# Patient Record
Sex: FEMALE | Race: ASIAN | Employment: FULL TIME | URBAN - METROPOLITAN AREA
[De-identification: names, ages, dates, MRNs, and addresses within clinical notes are randomized per-mention and may not be internally consistent; named-entity substitution may affect disease eponyms.]

---

## 2017-01-30 ENCOUNTER — OP HISTORICAL/CONVERTED ENCOUNTER (OUTPATIENT)
Dept: OTHER | Age: 62
End: 2017-01-30

## 2017-02-01 ENCOUNTER — OP HISTORICAL/CONVERTED ENCOUNTER (OUTPATIENT)
Dept: OTHER | Age: 62
End: 2017-02-01

## 2017-02-16 ENCOUNTER — OP HISTORICAL/CONVERTED ENCOUNTER (OUTPATIENT)
Dept: OTHER | Age: 62
End: 2017-02-16

## 2017-02-22 ENCOUNTER — OP HISTORICAL/CONVERTED ENCOUNTER (OUTPATIENT)
Dept: OTHER | Age: 62
End: 2017-02-22

## 2018-06-01 ENCOUNTER — ED HISTORICAL/CONVERTED ENCOUNTER (OUTPATIENT)
Dept: OTHER | Age: 63
End: 2018-06-01

## 2019-05-21 ENCOUNTER — OP HISTORICAL/CONVERTED ENCOUNTER (OUTPATIENT)
Dept: OTHER | Age: 64
End: 2019-05-21

## 2019-06-28 ENCOUNTER — OP HISTORICAL/CONVERTED ENCOUNTER (OUTPATIENT)
Dept: OTHER | Age: 64
End: 2019-06-28

## 2019-07-16 ENCOUNTER — OP HISTORICAL/CONVERTED ENCOUNTER (OUTPATIENT)
Dept: OTHER | Age: 64
End: 2019-07-16

## 2021-10-04 ENCOUNTER — TRANSCRIBE ORDER (OUTPATIENT)
Dept: SCHEDULING | Age: 66
End: 2021-10-04

## 2021-10-04 DIAGNOSIS — Z12.31 SCREENING MAMMOGRAM FOR BREAST CANCER: Primary | ICD-10-CM

## 2022-04-18 ENCOUNTER — TRANSCRIBE ORDER (OUTPATIENT)
Dept: SCHEDULING | Age: 67
End: 2022-04-18

## 2022-04-18 DIAGNOSIS — Z12.31 VISIT FOR SCREENING MAMMOGRAM: Primary | ICD-10-CM

## 2022-04-21 ENCOUNTER — HOSPITAL ENCOUNTER (OUTPATIENT)
Age: 67
Setting detail: OBSERVATION
Discharge: HOME | End: 2022-04-23
Attending: EMERGENCY MEDICINE | Admitting: HOSPITALIST
Payer: COMMERCIAL

## 2022-04-21 ENCOUNTER — APPOINTMENT (OUTPATIENT)
Dept: CT IMAGING | Age: 67
End: 2022-04-21
Attending: EMERGENCY MEDICINE
Payer: COMMERCIAL

## 2022-04-21 DIAGNOSIS — I95.9 HYPOTENSION, UNSPECIFIED HYPOTENSION TYPE: Primary | ICD-10-CM

## 2022-04-21 DIAGNOSIS — K86.89 PANCREATIC MASS: ICD-10-CM

## 2022-04-21 LAB
ALBUMIN SERPL-MCNC: 3.4 G/DL (ref 3.5–5)
ALBUMIN/GLOB SERPL: 1 {RATIO} (ref 1.1–2.2)
ALP SERPL-CCNC: 69 U/L (ref 45–117)
ALT SERPL-CCNC: 31 U/L (ref 12–78)
ANION GAP SERPL CALC-SCNC: 9 MMOL/L (ref 5–15)
APPEARANCE UR: ABNORMAL
AST SERPL W P-5'-P-CCNC: 25 U/L (ref 15–37)
BACTERIA URNS QL MICRO: ABNORMAL /HPF
BASOPHILS # BLD: 0 K/UL (ref 0–0.1)
BASOPHILS NFR BLD: 0 % (ref 0–1)
BILIRUB DIRECT SERPL-MCNC: 0.2 MG/DL (ref 0–0.2)
BILIRUB SERPL-MCNC: 0.5 MG/DL (ref 0.2–1)
BILIRUB UR QL: NEGATIVE
BUN SERPL-MCNC: 23 MG/DL (ref 6–20)
BUN/CREAT SERPL: 22 (ref 12–20)
CA-I BLD-MCNC: 8.9 MG/DL (ref 8.5–10.1)
CHLORIDE SERPL-SCNC: 104 MMOL/L (ref 97–108)
CO2 SERPL-SCNC: 27 MMOL/L (ref 21–32)
COLOR UR: YELLOW
CREAT SERPL-MCNC: 1.05 MG/DL (ref 0.55–1.02)
DIFFERENTIAL METHOD BLD: ABNORMAL
EOSINOPHIL # BLD: 0.1 K/UL (ref 0–0.4)
EOSINOPHIL NFR BLD: 1 % (ref 0–7)
EPITH CASTS URNS QL MICRO: ABNORMAL /LPF
ERYTHROCYTE [DISTWIDTH] IN BLOOD BY AUTOMATED COUNT: 13.3 % (ref 11.5–14.5)
GLOBULIN SER CALC-MCNC: 3.4 G/DL (ref 2–4)
GLUCOSE SERPL-MCNC: 148 MG/DL (ref 65–100)
GLUCOSE UR STRIP.AUTO-MCNC: NEGATIVE MG/DL
HCT VFR BLD AUTO: 33.4 % (ref 35–47)
HGB BLD-MCNC: 11.3 G/DL (ref 11.5–16)
HGB UR QL STRIP: NEGATIVE
IMM GRANULOCYTES # BLD AUTO: 0.1 K/UL (ref 0–0.04)
IMM GRANULOCYTES NFR BLD AUTO: 1 % (ref 0–0.5)
KETONES UR QL STRIP.AUTO: NEGATIVE MG/DL
LEUKOCYTE ESTERASE UR QL STRIP.AUTO: ABNORMAL
LIPASE SERPL-CCNC: 108 U/L (ref 73–393)
LYMPHOCYTES # BLD: 2.3 K/UL (ref 0.8–3.5)
LYMPHOCYTES NFR BLD: 25 % (ref 12–49)
MCH RBC QN AUTO: 30.5 PG (ref 26–34)
MCHC RBC AUTO-ENTMCNC: 33.8 G/DL (ref 30–36.5)
MCV RBC AUTO: 90.3 FL (ref 80–99)
MONOCYTES # BLD: 0.6 K/UL (ref 0–1)
MONOCYTES NFR BLD: 6 % (ref 5–13)
MUCOUS THREADS URNS QL MICRO: ABNORMAL /LPF
NEUTS SEG # BLD: 6.2 K/UL (ref 1.8–8)
NEUTS SEG NFR BLD: 67 % (ref 32–75)
NITRITE UR QL STRIP.AUTO: NEGATIVE
PH UR STRIP: 5 [PH] (ref 5–8)
PLATELET # BLD AUTO: 272 K/UL (ref 150–400)
PMV BLD AUTO: 9.7 FL (ref 8.9–12.9)
POTASSIUM SERPL-SCNC: 4 MMOL/L (ref 3.5–5.1)
PROT SERPL-MCNC: 6.8 G/DL (ref 6.4–8.2)
PROT UR STRIP-MCNC: ABNORMAL MG/DL
RBC # BLD AUTO: 3.7 M/UL (ref 3.8–5.2)
RBC #/AREA URNS HPF: ABNORMAL /HPF (ref 0–5)
SODIUM SERPL-SCNC: 140 MMOL/L (ref 136–145)
SP GR UR REFRACTOMETRY: 1.01 (ref 1–1.03)
TROPONIN-HIGH SENSITIVITY: 14 NG/L (ref 0–51)
UROBILINOGEN UR QL STRIP.AUTO: 0.1 EU/DL (ref 0.2–1)
WBC # BLD AUTO: 9.2 K/UL (ref 3.6–11)
WBC URNS QL MICRO: ABNORMAL /HPF (ref 0–4)

## 2022-04-21 PROCEDURE — 80076 HEPATIC FUNCTION PANEL: CPT

## 2022-04-21 PROCEDURE — 85025 COMPLETE CBC W/AUTO DIFF WBC: CPT

## 2022-04-21 PROCEDURE — 80048 BASIC METABOLIC PNL TOTAL CA: CPT

## 2022-04-21 PROCEDURE — 93005 ELECTROCARDIOGRAM TRACING: CPT

## 2022-04-21 PROCEDURE — 99285 EMERGENCY DEPT VISIT HI MDM: CPT

## 2022-04-21 PROCEDURE — 84484 ASSAY OF TROPONIN QUANT: CPT

## 2022-04-21 PROCEDURE — 74177 CT ABD & PELVIS W/CONTRAST: CPT

## 2022-04-21 PROCEDURE — 83690 ASSAY OF LIPASE: CPT

## 2022-04-21 PROCEDURE — 74011000636 HC RX REV CODE- 636: Performed by: EMERGENCY MEDICINE

## 2022-04-21 PROCEDURE — 81001 URINALYSIS AUTO W/SCOPE: CPT

## 2022-04-21 PROCEDURE — 36415 COLL VENOUS BLD VENIPUNCTURE: CPT

## 2022-04-21 PROCEDURE — 74011250636 HC RX REV CODE- 250/636: Performed by: EMERGENCY MEDICINE

## 2022-04-21 RX ORDER — FOLIC ACID 1 MG/1
TABLET ORAL
COMMUNITY

## 2022-04-21 RX ORDER — AMLODIPINE BESYLATE AND BENAZEPRIL HYDROCHLORIDE 10; 40 MG/1; MG/1
CAPSULE ORAL
COMMUNITY
Start: 2022-02-24

## 2022-04-21 RX ORDER — CLOBETASOL PROPIONATE 0.5 MG/G
CREAM TOPICAL
COMMUNITY
Start: 2022-04-18

## 2022-04-21 RX ORDER — METHOTREXATE 25 MG/ML
INJECTION, SOLUTION INTRA-ARTERIAL; INTRAMUSCULAR; INTRAVENOUS
COMMUNITY
Start: 2022-03-24

## 2022-04-21 RX ORDER — IXEKIZUMAB 80 MG/ML
INJECTION, SOLUTION SUBCUTANEOUS
COMMUNITY
Start: 2022-04-11

## 2022-04-21 RX ORDER — TIZANIDINE HYDROCHLORIDE 6 MG/1
CAPSULE, GELATIN COATED ORAL
COMMUNITY
Start: 2022-02-24 | End: 2022-04-23

## 2022-04-21 RX ORDER — LEVOTHYROXINE SODIUM 112 UG/1
TABLET ORAL
COMMUNITY
Start: 2022-04-18

## 2022-04-21 RX ORDER — OMEPRAZOLE 40 MG/1
CAPSULE, DELAYED RELEASE ORAL
COMMUNITY
Start: 2022-04-18

## 2022-04-21 RX ADMIN — IOPAMIDOL 100 ML: 755 INJECTION, SOLUTION INTRAVENOUS at 23:09

## 2022-04-21 RX ADMIN — SODIUM CHLORIDE 1000 ML: 9 INJECTION, SOLUTION INTRAVENOUS at 22:23

## 2022-04-21 NOTE — Clinical Note
Patient Class[de-identified] OBSERVATION [104]   Type of Bed: Remote Telemetry [29]   Cardiac Monitoring Required?: Yes   Reason for Observation: hypotension / pancreatic mass   Admitting Diagnosis: Hypotension [484997]   Admitting Diagnosis: Pancreatic mass [[id number]]   Admitting Physician: Lieutenant Adam [[id number]]   Attending Physician: Lieutenant Adma [[id number]]

## 2022-04-22 ENCOUNTER — APPOINTMENT (OUTPATIENT)
Dept: MRI IMAGING | Age: 67
End: 2022-04-22
Attending: HOSPITALIST
Payer: COMMERCIAL

## 2022-04-22 PROBLEM — I95.9 HYPOTENSION: Status: ACTIVE | Noted: 2022-04-22

## 2022-04-22 PROBLEM — K86.89 PANCREATIC MASS: Status: ACTIVE | Noted: 2022-04-22

## 2022-04-22 LAB
ALBUMIN SERPL-MCNC: 3.2 G/DL (ref 3.5–5)
ANION GAP SERPL CALC-SCNC: 4 MMOL/L (ref 5–15)
ATRIAL RATE: 59 BPM
BUN SERPL-MCNC: 14 MG/DL (ref 6–20)
BUN/CREAT SERPL: 18 (ref 12–20)
CA-I BLD-MCNC: 8.3 MG/DL (ref 8.5–10.1)
CALCULATED P AXIS, ECG09: 45 DEGREES
CALCULATED R AXIS, ECG10: 32 DEGREES
CALCULATED T AXIS, ECG11: 46 DEGREES
CHLORIDE SERPL-SCNC: 110 MMOL/L (ref 97–108)
CO2 SERPL-SCNC: 28 MMOL/L (ref 21–32)
CREAT SERPL-MCNC: 0.76 MG/DL (ref 0.55–1.02)
DIAGNOSIS, 93000: NORMAL
GLUCOSE SERPL-MCNC: 146 MG/DL (ref 65–100)
P-R INTERVAL, ECG05: 162 MS
PHOSPHATE SERPL-MCNC: 3.1 MG/DL (ref 2.6–4.7)
POTASSIUM SERPL-SCNC: 3.8 MMOL/L (ref 3.5–5.1)
Q-T INTERVAL, ECG07: 432 MS
QRS DURATION, ECG06: 66 MS
QTC CALCULATION (BEZET), ECG08: 427 MS
SODIUM SERPL-SCNC: 142 MMOL/L (ref 136–145)
VENTRICULAR RATE, ECG03: 59 BPM

## 2022-04-22 PROCEDURE — 74011250636 HC RX REV CODE- 250/636: Performed by: HOSPITALIST

## 2022-04-22 PROCEDURE — A9577 INJ MULTIHANCE: HCPCS | Performed by: HOSPITALIST

## 2022-04-22 PROCEDURE — 36415 COLL VENOUS BLD VENIPUNCTURE: CPT

## 2022-04-22 PROCEDURE — G0378 HOSPITAL OBSERVATION PER HR: HCPCS

## 2022-04-22 PROCEDURE — 74182 MRI ABDOMEN W/CONTRAST: CPT

## 2022-04-22 PROCEDURE — 96372 THER/PROPH/DIAG INJ SC/IM: CPT

## 2022-04-22 PROCEDURE — 96374 THER/PROPH/DIAG INJ IV PUSH: CPT

## 2022-04-22 PROCEDURE — 74011250636 HC RX REV CODE- 250/636: Performed by: EMERGENCY MEDICINE

## 2022-04-22 PROCEDURE — 74011250636 HC RX REV CODE- 250/636: Performed by: INTERNAL MEDICINE

## 2022-04-22 PROCEDURE — 74011000250 HC RX REV CODE- 250: Performed by: HOSPITALIST

## 2022-04-22 PROCEDURE — 74011000258 HC RX REV CODE- 258: Performed by: HOSPITALIST

## 2022-04-22 PROCEDURE — 80069 RENAL FUNCTION PANEL: CPT

## 2022-04-22 PROCEDURE — 74011250637 HC RX REV CODE- 250/637: Performed by: HOSPITALIST

## 2022-04-22 RX ORDER — DULOXETIN HYDROCHLORIDE 60 MG/1
60 CAPSULE, DELAYED RELEASE ORAL DAILY
COMMUNITY
Start: 2022-02-24

## 2022-04-22 RX ORDER — SODIUM CHLORIDE 0.9 % (FLUSH) 0.9 %
5-40 SYRINGE (ML) INJECTION EVERY 8 HOURS
Status: DISCONTINUED | OUTPATIENT
Start: 2022-04-22 | End: 2022-04-23 | Stop reason: HOSPADM

## 2022-04-22 RX ORDER — LISINOPRIL 40 MG/1
40 TABLET ORAL DAILY
Status: DISCONTINUED | OUTPATIENT
Start: 2022-04-23 | End: 2022-04-23 | Stop reason: HOSPADM

## 2022-04-22 RX ORDER — LABETALOL HCL 20 MG/4 ML
10 SYRINGE (ML) INTRAVENOUS
Status: DISCONTINUED | OUTPATIENT
Start: 2022-04-22 | End: 2022-04-23 | Stop reason: HOSPADM

## 2022-04-22 RX ORDER — LEVOTHYROXINE SODIUM 112 UG/1
112 TABLET ORAL
Status: DISCONTINUED | OUTPATIENT
Start: 2022-04-22 | End: 2022-04-23 | Stop reason: HOSPADM

## 2022-04-22 RX ORDER — PANTOPRAZOLE SODIUM 40 MG/1
40 TABLET, DELAYED RELEASE ORAL
Status: DISCONTINUED | OUTPATIENT
Start: 2022-04-22 | End: 2022-04-23 | Stop reason: HOSPADM

## 2022-04-22 RX ORDER — ONDANSETRON 4 MG/1
4 TABLET, ORALLY DISINTEGRATING ORAL
Status: DISCONTINUED | OUTPATIENT
Start: 2022-04-22 | End: 2022-04-23 | Stop reason: HOSPADM

## 2022-04-22 RX ORDER — FOLIC ACID 1 MG/1
1 TABLET ORAL DAILY
Status: DISCONTINUED | OUTPATIENT
Start: 2022-04-22 | End: 2022-04-23 | Stop reason: HOSPADM

## 2022-04-22 RX ORDER — ENOXAPARIN SODIUM 100 MG/ML
1 INJECTION SUBCUTANEOUS EVERY 12 HOURS
Status: DISCONTINUED | OUTPATIENT
Start: 2022-04-22 | End: 2022-04-23 | Stop reason: HOSPADM

## 2022-04-22 RX ORDER — ACETAMINOPHEN 650 MG/1
650 SUPPOSITORY RECTAL
Status: DISCONTINUED | OUTPATIENT
Start: 2022-04-22 | End: 2022-04-23 | Stop reason: HOSPADM

## 2022-04-22 RX ORDER — ONDANSETRON 2 MG/ML
4 INJECTION INTRAMUSCULAR; INTRAVENOUS
Status: DISCONTINUED | OUTPATIENT
Start: 2022-04-22 | End: 2022-04-23 | Stop reason: HOSPADM

## 2022-04-22 RX ORDER — CLOBETASOL PROPIONATE 0.5 MG/G
OINTMENT TOPICAL 2 TIMES DAILY
Status: DISCONTINUED | OUTPATIENT
Start: 2022-04-22 | End: 2022-04-23 | Stop reason: HOSPADM

## 2022-04-22 RX ORDER — ACETAMINOPHEN 325 MG/1
650 TABLET ORAL
Status: DISCONTINUED | OUTPATIENT
Start: 2022-04-22 | End: 2022-04-23 | Stop reason: HOSPADM

## 2022-04-22 RX ORDER — SODIUM CHLORIDE 0.9 % (FLUSH) 0.9 %
5-40 SYRINGE (ML) INJECTION AS NEEDED
Status: DISCONTINUED | OUTPATIENT
Start: 2022-04-22 | End: 2022-04-23 | Stop reason: HOSPADM

## 2022-04-22 RX ORDER — AMLODIPINE BESYLATE 5 MG/1
10 TABLET ORAL DAILY
Status: DISCONTINUED | OUTPATIENT
Start: 2022-04-23 | End: 2022-04-23 | Stop reason: HOSPADM

## 2022-04-22 RX ORDER — DEXTROSE MONOHYDRATE AND SODIUM CHLORIDE 5; .9 G/100ML; G/100ML
125 INJECTION, SOLUTION INTRAVENOUS CONTINUOUS
Status: DISCONTINUED | OUTPATIENT
Start: 2022-04-22 | End: 2022-04-22

## 2022-04-22 RX ORDER — DULOXETIN HYDROCHLORIDE 30 MG/1
60 CAPSULE, DELAYED RELEASE ORAL DAILY
Status: DISCONTINUED | OUTPATIENT
Start: 2022-04-22 | End: 2022-04-23 | Stop reason: HOSPADM

## 2022-04-22 RX ADMIN — SODIUM CHLORIDE 1000 ML: 9 INJECTION, SOLUTION INTRAVENOUS at 01:03

## 2022-04-22 RX ADMIN — PANTOPRAZOLE SODIUM 40 MG: 40 TABLET, DELAYED RELEASE ORAL at 06:26

## 2022-04-22 RX ADMIN — LEVOTHYROXINE SODIUM 112 MCG: 0.11 TABLET ORAL at 06:26

## 2022-04-22 RX ADMIN — FOLIC ACID 1 MG: 1 TABLET ORAL at 08:36

## 2022-04-22 RX ADMIN — ACETAMINOPHEN 650 MG: 325 TABLET ORAL at 20:21

## 2022-04-22 RX ADMIN — SODIUM CHLORIDE, PRESERVATIVE FREE 10 ML: 5 INJECTION INTRAVENOUS at 16:44

## 2022-04-22 RX ADMIN — SODIUM CHLORIDE, PRESERVATIVE FREE 10 ML: 5 INJECTION INTRAVENOUS at 20:21

## 2022-04-22 RX ADMIN — ENOXAPARIN SODIUM 70 MG: 100 INJECTION SUBCUTANEOUS at 16:44

## 2022-04-22 RX ADMIN — GADOBENATE DIMEGLUMINE 14 ML: 529 INJECTION, SOLUTION INTRAVENOUS at 16:32

## 2022-04-22 RX ADMIN — SODIUM CHLORIDE, PRESERVATIVE FREE 10 ML: 5 INJECTION INTRAVENOUS at 06:27

## 2022-04-22 RX ADMIN — DEXTROSE AND SODIUM CHLORIDE 125 ML/HR: 5; 900 INJECTION, SOLUTION INTRAVENOUS at 03:34

## 2022-04-22 RX ADMIN — LABETALOL HYDROCHLORIDE 10 MG: 5 INJECTION, SOLUTION INTRAVENOUS at 16:56

## 2022-04-22 RX ADMIN — DULOXETINE 60 MG: 30 CAPSULE, DELAYED RELEASE ORAL at 08:36

## 2022-04-22 RX ADMIN — CEFTRIAXONE 1 G: 1 INJECTION, POWDER, FOR SOLUTION INTRAMUSCULAR; INTRAVENOUS at 08:35

## 2022-04-22 NOTE — PROGRESS NOTES
Myself and Gi Nolasco RN performed the initial skin assessment. Pt skin is clean, dry, and intact with no signs of breakdown.

## 2022-04-22 NOTE — ED PROVIDER NOTES
EMERGENCY DEPARTMENT HISTORY AND PHYSICAL EXAM      Date: 4/21/2022  Patient Name: Aminata Cardenas    History of Presenting Illness       History Provided By: Patient    HPI: Aminata Cardenas, 73 y.o. female presents to the ED with cc of back pain. Patient complains of upper back pain for last 2-month. Pain has been constant with fluctuating intensity without obvious aggravating alleviating factors. Patient was prescribed with muscle relaxant which she took today. Patient states that she has episode of low blood pressure after taking the muscle relaxant. Patient came to emergency room stating that blood pressure reading was low at home. No dizziness, syncope, chest pain or shortness of breath. No nausea vomiting diarrhea. No signs of GI bleeding. Past History     Past Medical History:  Past Medical History:   Diagnosis Date    HTN (hypertension)     Joint pain     Rheumatism     Thyroid disorder        Past Surgical History:  Past Surgical History:   Procedure Laterality Date    HX HYSTERECTOMY         Family History:  Family History   Problem Relation Age of Onset    Hypertension Mother        Social History:  Social History     Tobacco Use    Smoking status: Never Smoker    Smokeless tobacco: Never Used   Substance Use Topics    Alcohol use: No    Drug use: No       Allergies: Allergies   Allergen Reactions    Pcn [Penicillins] Itching         Review of Systems   Review of Systems   Constitutional: Negative for chills and fever. HENT: Negative for sore throat. Respiratory: Negative for shortness of breath. Cardiovascular: Negative for chest pain. Gastrointestinal: Negative for abdominal pain and vomiting. Genitourinary: Negative for dysuria. Musculoskeletal: Positive for back pain. Negative for joint swelling. Skin: Negative for rash. Neurological: Negative for headaches. Psychiatric/Behavioral: Negative for suicidal ideas.    All other systems reviewed and are negative. Physical Exam   Physical Exam  Vitals and nursing note reviewed. Constitutional:       General: She is not in acute distress. Appearance: Normal appearance. She is not ill-appearing, toxic-appearing or diaphoretic. HENT:      Head: Normocephalic and atraumatic. Nose: Nose normal.      Mouth/Throat:      Mouth: Mucous membranes are moist.   Eyes:      Conjunctiva/sclera: Conjunctivae normal.   Cardiovascular:      Rate and Rhythm: Normal rate and regular rhythm. Heart sounds: Normal heart sounds. Pulmonary:      Effort: Pulmonary effort is normal.      Breath sounds: Normal breath sounds. Abdominal:      General: Abdomen is flat. Bowel sounds are normal. There is no distension. Palpations: Abdomen is soft. Tenderness: There is no abdominal tenderness. There is no right CVA tenderness, left CVA tenderness, guarding or rebound. Musculoskeletal:      Cervical back: Neck supple. Right lower leg: No edema. Left lower leg: No edema. Comments: Thoracic and lumbar midline nontender   Skin:     General: Skin is warm and dry. Neurological:      General: No focal deficit present. Mental Status: She is alert and oriented to person, place, and time. Psychiatric:         Behavior: Behavior normal.         Thought Content:  Thought content normal.         Diagnostic Study Results     Labs -     Recent Results (from the past 12 hour(s))   CBC WITH AUTOMATED DIFF    Collection Time: 04/21/22  9:57 PM   Result Value Ref Range    WBC 9.2 3.6 - 11.0 K/uL    RBC 3.70 (L) 3.80 - 5.20 M/uL    HGB 11.3 (L) 11.5 - 16.0 g/dL    HCT 33.4 (L) 35.0 - 47.0 %    MCV 90.3 80.0 - 99.0 FL    MCH 30.5 26.0 - 34.0 PG    MCHC 33.8 30.0 - 36.5 g/dL    RDW 13.3 11.5 - 14.5 %    PLATELET 114 258 - 733 K/uL    MPV 9.7 8.9 - 12.9 FL    NEUTROPHILS 67 32 - 75 %    LYMPHOCYTES 25 12 - 49 %    MONOCYTES 6 5 - 13 %    EOSINOPHILS 1 0 - 7 %    BASOPHILS 0 0 - 1 %    IMMATURE GRANULOCYTES 1 (H) 0.0 - 0.5 %    ABS. NEUTROPHILS 6.2 1.8 - 8.0 K/UL    ABS. LYMPHOCYTES 2.3 0.8 - 3.5 K/UL    ABS. MONOCYTES 0.6 0.0 - 1.0 K/UL    ABS. EOSINOPHILS 0.1 0.0 - 0.4 K/UL    ABS. BASOPHILS 0.0 0.0 - 0.1 K/UL    ABS. IMM. GRANS. 0.1 (H) 0.00 - 0.04 K/UL    DF AUTOMATED     METABOLIC PANEL, BASIC    Collection Time: 04/21/22  9:57 PM   Result Value Ref Range    Sodium 140 136 - 145 mmol/L    Potassium 4.0 3.5 - 5.1 mmol/L    Chloride 104 97 - 108 mmol/L    CO2 27 21 - 32 mmol/L    Anion gap 9 5 - 15 mmol/L    Glucose 148 (H) 65 - 100 mg/dL    BUN 23 (H) 6 - 20 mg/dL    Creatinine 1.05 (H) 0.55 - 1.02 mg/dL    BUN/Creatinine ratio 22 (H) 12 - 20      GFR est AA >60 >60 ml/min/1.73m2    GFR est non-AA 53 (L) >60 ml/min/1.73m2    Calcium 8.9 8.5 - 10.1 mg/dL   TROPONIN-HIGH SENSITIVITY    Collection Time: 04/21/22  9:57 PM   Result Value Ref Range    Troponin-High Sensitivity 14 0 - 51 ng/L   HEPATIC FUNCTION PANEL    Collection Time: 04/21/22  9:57 PM   Result Value Ref Range    Protein, total 6.8 6.4 - 8.2 g/dL    Albumin 3.4 (L) 3.5 - 5.0 g/dL    Globulin 3.4 2.0 - 4.0 g/dL    A-G Ratio 1.0 (L) 1.1 - 2.2      Bilirubin, total 0.5 0.2 - 1.0 mg/dL    Bilirubin, direct 0.2 0.0 - 0.2 mg/dL    Alk.  phosphatase 69 45 - 117 U/L    AST (SGOT) 25 15 - 37 U/L    ALT (SGPT) 31 12 - 78 U/L   LIPASE    Collection Time: 04/21/22  9:57 PM   Result Value Ref Range    Lipase 108 73 - 393 U/L   URINALYSIS W/ RFLX MICROSCOPIC    Collection Time: 04/21/22 11:00 PM   Result Value Ref Range    Color Yellow      Appearance Hazy (A) Clear      Specific gravity 1.015 1.003 - 1.030      pH (UA) 5.0 5.0 - 8.0      Protein Trace (A) Negative mg/dL    Glucose Negative Negative mg/dL    Ketone Negative Negative mg/dL    Bilirubin Negative Negative      Blood Negative Negative      Urobilinogen 0.1 (L) 0.2 - 1.0 EU/dL    Nitrites Negative Negative      Leukocyte Esterase Large (A) Negative     URINE MICROSCOPIC    Collection Time: 04/21/22 11:00 PM Result Value Ref Range    WBC 10-20 0 - 4 /hpf    RBC 0-5 0 - 5 /hpf    Epithelial cells Moderate (A) Few /lpf    Bacteria 3+ (A) Negative /hpf    Mucus 1+ (A) Negative /lpf       Radiologic Studies -   [unfilled]  CT Results  (Last 48 hours)               04/21/22 2306  CT ABD PELV W CONT Final result    Impression:  Abnormal pancreas with peripancreatic changes extending into the   mesentery and encasing the mesenteric vessels. There is associated dilatation of   the main pancreatic duct in the tail of the pancreas and dilatation of the   biliary tract. There is also nonocclusive thrombus in the superior mesenteric   vein. Findings are highly suggestive of pancreatic carcinoma although complex   pancreatitis with central pancreatic necrosis might conceivably give this   appearance. Narrative:  PROCEDURE: CT ABD PELV W CONT       HISTORY:Lower back pain, hypotension       COMPARISON:None       Department policy stipulates all CT scans at this facility are performed using   dose reduction optimization techniques as appropriate to the performed exam,   including the following: Automated exposure control, adjustments of the mA   and/or KVP according to the patient size, and the use of iterative   reconstruction technique. LIMITATIONS: None       TECHNIQUE: Axial images with multiplanar reconstruction following intravenous   contrast.100 mL Isovue-370       CHEST: No acute airspace process or pleural effusion seen at the lung bases. LIVER: There are multiple hypodense lesions in the liver with sharply defined   margins. The largest is 2.6 cm in diameter and measures water density. The portal vein is patent. In the region of the pancreas there is narrowing of   the splenic vein and the portal vein associated with irregular soft tissue   density encasing the venous structures as well as the celiac vein and the   superior mesenteric vein.    Nonocclusive thrombus is seen in the superior mesenteric vein just proximal to   the pancreas. GALLBLADDER: Gallbladder is distended. No stones visualized. BILIARY TREE: Common bile duct is dilated up to 12 mm in diameter. Intrahepatic   biliary tract is mildly dilated. Distal common bile duct terminates abruptly. PANCREAS: There is stranding in the fat planes around the pancreas. The mid body   of the pancreas shows diminished enhancement. In the tail the pancreas the main   pancreatic duct is dilated. In the head of the pancreas there is a 13 x 22 mm   hypodense lesion. SPLEEN: Normal   ADRENAL GLANDS: Normal   KIDNEYS/URETERS/BLADDER: Normal   RETROPERITONEUM/AORTA: Aorta shows no evidence of aneurysm or dissection. No   periaortic adenopathy. BOWEL/MESENTERY: Stomach, small bowel and colon are unremarkable. There is   stranding in the central mesenteric fat. APPENDIX: Identified and normal   PERITONEAL CAVITY: Small amounts of free intraperitoneal fluid. No free air   REPRODUCTIVE ORGANS: Removed   BONE/TISSUES: No acute abnormality. OTHER: None                CXR Results  (Last 48 hours)    None          Medical Decision Making and ED Course   I am the first provider for this patient. I reviewed the vital signs, available nursing notes, past medical history, past surgical history, family history and social history. BP improved after IV hydration. Labs and CT of the abdomen was obtained to mainly rule out possible aortic aneurysm or dissection that is because of the back pain. CT was significant for a possible pancreatic mass suggestive of carcinoma. I discussed with the hospitalist for observation for hypertension and evaluation of the pancreatic mass    Vital Signs-Reviewed the patient's vital signs. Records Reviewed: Nursing note reviewed      ED Course:   Initial assessment performed.  The patients presenting problems have been discussed, and they are in agreement with the care plan formulated and outlined with them.  I have encouraged them to ask questions as they arise throughout their visit. Procedures       Teja Franco MD                Disposition     Admitted      DISCHARGE PLAN:  1. Current Discharge Medication List      CONTINUE these medications which have NOT CHANGED    Details   amLODIPine-benazepril (LOTREL) 10-40 mg per capsule       clobetasoL (TEMOVATE) 0.05 % topical cream       omeprazole (PRILOSEC) 40 mg capsule       !! levothyroxine (SYNTHROID) 112 mcg tablet       tiZANidine (ZANAFLEX) 6 mg capsule       methotrexate 25 mg/mL chemo injection       folic acid (FOLVITE) 1 mg tablet folic acid 1 mg tablet      Taltz Autoinjector 80 mg/mL auto injector       !! levothyroxine (SYNTHROID) 137 mcg tablet Take 137 mcg by mouth Daily (before breakfast). Qty: 30 Tab, Refills: 6    Associated Diagnoses: Other specified acquired hypothyroidism; Unspecified vitamin D deficiency      methotrexate sodium 2.5 mg DsPk Take  by mouth.        ergocalciferol (DRISDOL) 50,000 unit capsule Take 1 Cap by mouth every seven (7) days. Qty: 8 Cap, Refills: 3      potassium chloride SR (KLOR-CON 10) 10 mEq tablet Take 20 mEq by mouth daily. Associated Diagnoses: Other specified acquired hypothyroidism; Unspecified vitamin D deficiency      etanercept (ENBREL) 50 mg/mL (0.98 mL) injection 50 mg by SubCUTAneous route every seven (7) days. Associated Diagnoses: Other specified acquired hypothyroidism; Unspecified vitamin D deficiency      oxaprozin (DAYPRO) 600 mg tablet Take 600 mg by mouth two (2) times a day. Associated Diagnoses: Other specified acquired hypothyroidism; Unspecified vitamin D deficiency       ! ! - Potential duplicate medications found. Please discuss with provider. STOP taking these medications       Olmesartan-Amlodipine-HCTZ (TRIBENZOR) 40-10-25 mg Tab Comments:   Reason for Stoppin.   Follow-up Information    None       3.   Return to ED if worse     Diagnosis Clinical Impression:   1. Hypotension, unspecified hypotension type    2. Pancreatic mass        Attestations:    Teja Franco MD    Please note that this dictation was completed with [employer], the computer voice recognition software. Quite often unanticipated grammatical, syntax, homophones, and other interpretive errors are inadvertently transcribed by the computer software. Please disregard these errors. Please excuse any errors that have escaped final proofreading. Thank you.

## 2022-04-22 NOTE — PROGRESS NOTES
Reason for Admission:  Pancreatitis                     RUR Score: N/A                   Plan for utilizing home health:    None      PCP: First and Last name:  Samantha Maldonado MD     Name of Practice:    Are you a current patient: Yes/No: Yes   Approximate date of last visit: monday   Can you participate in a virtual visit with your PCP:                     Current Advanced Directive/Advance Care Plan: Full Code      Healthcare Decision Maker:   Click here to complete [address] including selection of the [address] Relationship (ie \"Primary\")           Olivia Warner  [phone]                  Transition of Care Plan:                    CM discussed discharge planning with patient at bedside. Patient will return home self care. Son will assist as needed. Patient lives in a two story home. She is independent with ADL/IADL care. Patient self medicate and drives to appt. Son to transport at discharge. Pharmacy: [address] [facility].

## 2022-04-22 NOTE — PROGRESS NOTES
Patient was seen and examine by me,    66F, h/o psoriatic arthritis on immunosuppressive treatment, HTN with generalized weakness and hypotension s.t tizanidine use. CT abdomen was performed due to bloating, that showed possible pancreatic mass with mesenteric thrombus    hypotension and generalized weakness resolved. Oncology recommended MRI pancreas to assess details of mass.      Also her UA suggestive of UTI, started ceftriaxone  And PRN labetalol for HTN    Discussed with patient, who is a nurse at [employer]

## 2022-04-22 NOTE — CONSULTS
Consult    Patient: Cabrera Rizo MRN: [id number]  SSN: [id number]    YOB: 1955  Age: 73 y.o. Sex: female      Subjective:      Cabrera Rizo is a 73 y.o. female who is being seen for abdominal pain and abnormal CT    A patient with hx of psoriatic arthritis on immunosuppressive treatment, who  presents to the emergency room, weakness, and some abdominal gassy and bloated pain,   Evaluation in the emergency room, CT of the abdomen done suggestive of peripancreatic changes encasing the mesenteric vessels also nonocclusive superior mesenteric thrombus. [facility] lipase was normal, hepatic panel unremarkable  Gi consultation placed,   Denies any fever, chills. No chest pain, palpitations, cough or trouble breathing.   Some weight loss, but no blood in stool  Past Medical History:   Diagnosis Date    HTN (hypertension)     Joint pain     Rheumatism     Thyroid disorder      Past Surgical History:   Procedure Laterality Date    HX HYSTERECTOMY        Family History   Problem Relation Age of Onset    Hypertension Mother      Social History     Tobacco Use    Smoking status: Never Smoker    Smokeless tobacco: Never Used   Substance Use Topics    Alcohol use: No      Current Facility-Administered Medications   Medication Dose Route Frequency Provider Last Rate Last Admin    pantoprazole (PROTONIX) tablet 40 mg  40 mg Oral ACB Ezekiel Espinosa MD   40 mg at 04/22/22 0626    levothyroxine (SYNTHROID) tablet 112 mcg  112 mcg Oral Mikhail Grace MD   112 mcg at 28/62/20 5533    folic acid (FOLVITE) tablet 1 mg  1 mg Oral DAILY Ezekiel Espinosa MD   1 mg at 04/22/22 0836    DULoxetine (CYMBALTA) capsule 60 mg  60 mg Oral DAILY Ezekiel Espinosa MD   60 mg at 04/22/22 0836    sodium chloride (NS) flush 5-40 mL  5-40 mL IntraVENous Q8H Ezekiel Espinosa MD   10 mL at 04/22/22 3845    sodium chloride (NS) flush 5-40 mL  5-40 mL IntraVENous PRN Brian Denisha Joshi MD        acetaminophen (TYLENOL) tablet 650 mg  650 mg Oral Q6H PRN Lyndsay Valderrama MD        Or   Sebas Leblanc acetaminophen (TYLENOL) suppository 650 mg  650 mg Rectal Q6H PRN Lyndsay Valderrama MD        ondansetron (ZOFRAN ODT) tablet 4 mg  4 mg Oral Q6H PRN Lyndsay Valderrama MD        Or    ondansetron [facility]) injection 4 mg  4 mg IntraVENous Q6H PRN Lyndsay Valderrama MD        dextrose 5% and 0.9% NaCl infusion  125 mL/hr IntraVENous CONTINUOUS Lyndsay Valderrama  mL/hr at 04/22/22 0334 125 mL/hr at 04/22/22 0334    cefTRIAXone (ROCEPHIN) 1 g in sterile water (preservative free) 10 mL IV syringe  1 g IntraVENous Q24H Livia Kramer MD   1 g at 04/22/22 4714        Allergies   Allergen Reactions    Pcn [Penicillins] Itching       Review of Systems:  Review of Systems   Constitutional: Positive for malaise/fatigue. HENT: Negative. Eyes: Positive for redness. Respiratory: Negative. Negative for sputum production. Cardiovascular: Negative. Gastrointestinal: Positive for abdominal pain. Musculoskeletal: Positive for back pain. Neurological: Positive for dizziness. Psychiatric/Behavioral: Negative. Negative for substance abuse. Objective:     Vitals:    04/22/22 0121 04/22/22 0242 04/22/22 0400 04/22/22 0808   BP: 124/77 138/83  (!) 149/90   Pulse: 66 69 67 (!) 55   Resp: 18 15  18   Temp:  98.3 °F (36.8 °C)  98.8 °F (37.1 °C)   SpO2: 100% 100%  100%   Weight:       Height:            Physical Exam:  Physical Exam  Constitutional:       Appearance: She is ill-appearing. HENT:      Head: Atraumatic. Mouth/Throat:      Mouth: Mucous membranes are dry. Eyes:      General: No scleral icterus. Cardiovascular:      Rate and Rhythm: Regular rhythm. Pulses: Normal pulses. Pulmonary:      Effort: Pulmonary effort is normal.   Abdominal:      General: Bowel sounds are normal.      Palpations: Abdomen is soft. Tenderness:  There is abdominal tenderness. There is no rebound. Hernia: No hernia is present. Musculoskeletal:         General: Normal range of motion. Cervical back: Neck supple. Skin:     General: Skin is warm and dry. Neurological:      Mental Status: Mental status is at baseline. Psychiatric:         Mood and Affect: Mood normal.          Recent Results (from the past 24 hour(s))   CBC WITH AUTOMATED DIFF    Collection Time: 04/21/22  9:57 PM   Result Value Ref Range    WBC 9.2 3.6 - 11.0 K/uL    RBC 3.70 (L) 3.80 - 5.20 M/uL    HGB 11.3 (L) 11.5 - 16.0 g/dL    HCT 33.4 (L) 35.0 - 47.0 %    MCV 90.3 80.0 - 99.0 FL    MCH 30.5 26.0 - 34.0 PG    MCHC 33.8 30.0 - 36.5 g/dL    RDW 13.3 11.5 - 14.5 %    PLATELET 429 086 - 462 K/uL    MPV 9.7 8.9 - 12.9 FL    NEUTROPHILS 67 32 - 75 %    LYMPHOCYTES 25 12 - 49 %    MONOCYTES 6 5 - 13 %    EOSINOPHILS 1 0 - 7 %    BASOPHILS 0 0 - 1 %    IMMATURE GRANULOCYTES 1 (H) 0.0 - 0.5 %    ABS. NEUTROPHILS 6.2 1.8 - 8.0 K/UL    ABS. LYMPHOCYTES 2.3 0.8 - 3.5 K/UL    ABS. MONOCYTES 0.6 0.0 - 1.0 K/UL    ABS. EOSINOPHILS 0.1 0.0 - 0.4 K/UL    ABS. BASOPHILS 0.0 0.0 - 0.1 K/UL    ABS. IMM.  GRANS. 0.1 (H) 0.00 - 0.04 K/UL    DF AUTOMATED     METABOLIC PANEL, BASIC    Collection Time: 04/21/22  9:57 PM   Result Value Ref Range    Sodium 140 136 - 145 mmol/L    Potassium 4.0 3.5 - 5.1 mmol/L    Chloride 104 97 - 108 mmol/L    CO2 27 21 - 32 mmol/L    Anion gap 9 5 - 15 mmol/L    Glucose 148 (H) 65 - 100 mg/dL    BUN 23 (H) 6 - 20 mg/dL    Creatinine 1.05 (H) 0.55 - 1.02 mg/dL    BUN/Creatinine ratio 22 (H) 12 - 20      GFR est AA >60 >60 ml/min/1.73m2    GFR est non-AA 53 (L) >60 ml/min/1.73m2    Calcium 8.9 8.5 - 10.1 mg/dL   TROPONIN-HIGH SENSITIVITY    Collection Time: 04/21/22  9:57 PM   Result Value Ref Range    Troponin-High Sensitivity 14 0 - 51 ng/L   HEPATIC FUNCTION PANEL    Collection Time: 04/21/22  9:57 PM   Result Value Ref Range    Protein, total 6.8 6.4 - 8.2 g/dL Albumin 3.4 (L) 3.5 - 5.0 g/dL    Globulin 3.4 2.0 - 4.0 g/dL    A-G Ratio 1.0 (L) 1.1 - 2.2      Bilirubin, total 0.5 0.2 - 1.0 mg/dL    Bilirubin, direct 0.2 0.0 - 0.2 mg/dL    Alk.  phosphatase 69 45 - 117 U/L    AST (SGOT) 25 15 - 37 U/L    ALT (SGPT) 31 12 - 78 U/L   LIPASE    Collection Time: 04/21/22  9:57 PM   Result Value Ref Range    Lipase 108 73 - 393 U/L   EKG, 12 LEAD, INITIAL    Collection Time: 04/21/22 10:19 PM   Result Value Ref Range    Ventricular Rate 59 BPM    Atrial Rate 59 BPM    P-R Interval 162 ms    QRS Duration 66 ms    Q-T Interval 432 ms    QTC Calculation (Bezet) 427 ms    Calculated P Axis 45 degrees    Calculated R Axis 32 degrees    Calculated T Axis 46 degrees    Diagnosis       Sinus bradycardia  Possible Left atrial enlargement  Borderline ECG  No previous ECGs available  Confirmed by MARK PAULINO CALI ([id number]) on 4/22/2022 11:46:36 AM     URINALYSIS W/ RFLX MICROSCOPIC    Collection Time: 04/21/22 11:00 PM   Result Value Ref Range    Color Yellow      Appearance Hazy (A) Clear      Specific gravity 1.015 1.003 - 1.030      pH (UA) 5.0 5.0 - 8.0      Protein Trace (A) Negative mg/dL    Glucose Negative Negative mg/dL    Ketone Negative Negative mg/dL    Bilirubin Negative Negative      Blood Negative Negative      Urobilinogen 0.1 (L) 0.2 - 1.0 EU/dL    Nitrites Negative Negative      Leukocyte Esterase Large (A) Negative     URINE MICROSCOPIC    Collection Time: 04/21/22 11:00 PM   Result Value Ref Range    WBC 10-20 0 - 4 /hpf    RBC 0-5 0 - 5 /hpf    Epithelial cells Moderate (A) Few /lpf    Bacteria 3+ (A) Negative /hpf    Mucus 1+ (A) Negative /lpf   RENAL FUNCTION PANEL    Collection Time: 04/22/22  6:23 AM   Result Value Ref Range    Sodium 142 136 - 145 mmol/L    Potassium 3.8 3.5 - 5.1 mmol/L    Chloride 110 (H) 97 - 108 mmol/L    CO2 28 21 - 32 mmol/L    Anion gap 4 (L) 5 - 15 mmol/L    Glucose 146 (H) 65 - 100 mg/dL    BUN 14 6 - 20 mg/dL    Creatinine 0.76 0.55 - 1.02 mg/dL    BUN/Creatinine ratio 18 12 - 20      GFR est AA >60 >60 ml/min/1.73m2    GFR est non-AA >60 >60 ml/min/1.73m2    Calcium 8.3 (L) 8.5 - 10.1 mg/dL    Phosphorus 3.1 2.6 - 4.7 mg/dL    Albumin 3.2 (L) 3.5 - 5.0 g/dL        CT ABD PELV W CONT   Final Result   Abnormal pancreas with peripancreatic changes extending into the   mesentery and encasing the mesenteric vessels. There is associated dilatation of   the main pancreatic duct in the tail of the pancreas and dilatation of the   biliary tract. There is also nonocclusive thrombus in the superior mesenteric   vein. Findings are highly suggestive of pancreatic carcinoma although complex   pancreatitis with central pancreatic necrosis might conceivably give this   appearance. MRI ABD W MRCP W CONT    (Results Pending)      Assessment:     Hospital Problems  Date Reviewed: 9/24/2012          Codes Class Noted POA    Hypotension ICD-10-CM: I95.9  ICD-9-CM: 458.9  4/22/2022 Unknown        Pancreatic mass ICD-10-CM: K86.89  ICD-9-CM: 577.8  4/22/2022 Unknown          Abnormal pancreas with mesenteric vein thrombus:   Etiology unclear, suspicious for malignancy need further work-up. Immunocompromised patient, rule out the underlying malignancy  Plan:   MRI has been ordered  CA 19-9 sent,  Hematology oncology consult for the etiology of thrombosis, studies,  We will follow the result of above test to make further recommendation, including EUS FNA .     plans discussed with patient  Signed By: Rl Jaffe MD     April 22, 2022         Thank you for allowing me to participate in this patients care  Cc Referring Physician   Tomas White MD

## 2022-04-22 NOTE — CONSULTS
Hematology Oncology Consultation    Reason for consult: Possible pancreatic mass      Admitting Diagnosis: Hypotension [I95.9]  Pancreatic mass [K86.89]    Impression:     1. Possible pancreatic mass  -will need MRI to characterize; discussed this w primary team and they have already ordered this  - pending as well    2. Mesenteric thrombosis  -incidental discovery  -will need anticoagulation for this; will start lovenox    3. Anemia of chronic illness    4. Psoriatic arthritis currently on immunosuppression    Discussion: Rosie Lawson is a  73y.o. year old seen in consultation at the request of Dr. Tato Gonsalves for pancreatic mass. She originally presented for evaluation of progressively not feeling well which was ascribed to a new medication (zanafex). She is also on LTAC, located within [facility] for psoriatic arthritis. For unclear reasons she underwent a CT scan in the ER showing possible pancreatic abnormalities and a mesenteric vein thrombosis. This service was consulted with respect to evaluation/management of this. No fevers chills abnromal weight loss. No falls, priior to this she felt in her usual state of health  Imaging:  CT scans reviewed    Labs:    Recent Results (from the past 24 hour(s))   CBC WITH AUTOMATED DIFF    Collection Time: 04/21/22  9:57 PM   Result Value Ref Range    WBC 9.2 3.6 - 11.0 K/uL    RBC 3.70 (L) 3.80 - 5.20 M/uL    HGB 11.3 (L) 11.5 - 16.0 g/dL    HCT 33.4 (L) 35.0 - 47.0 %    MCV 90.3 80.0 - 99.0 FL    MCH 30.5 26.0 - 34.0 PG    MCHC 33.8 30.0 - 36.5 g/dL    RDW 13.3 11.5 - 14.5 %    PLATELET 780 777 - 803 K/uL    MPV 9.7 8.9 - 12.9 FL    NEUTROPHILS 67 32 - 75 %    LYMPHOCYTES 25 12 - 49 %    MONOCYTES 6 5 - 13 %    EOSINOPHILS 1 0 - 7 %    BASOPHILS 0 0 - 1 %    IMMATURE GRANULOCYTES 1 (H) 0.0 - 0.5 %    ABS. NEUTROPHILS 6.2 1.8 - 8.0 K/UL    ABS. LYMPHOCYTES 2.3 0.8 - 3.5 K/UL    ABS. MONOCYTES 0.6 0.0 - 1.0 K/UL    ABS. EOSINOPHILS 0.1 0.0 - 0.4 K/UL    ABS.  BASOPHILS 0.0 0.0 - 0.1 K/UL ABS. IMM. GRANS. 0.1 (H) 0.00 - 0.04 K/UL    DF AUTOMATED     METABOLIC PANEL, BASIC    Collection Time: 04/21/22  9:57 PM   Result Value Ref Range    Sodium 140 136 - 145 mmol/L    Potassium 4.0 3.5 - 5.1 mmol/L    Chloride 104 97 - 108 mmol/L    CO2 27 21 - 32 mmol/L    Anion gap 9 5 - 15 mmol/L    Glucose 148 (H) 65 - 100 mg/dL    BUN 23 (H) 6 - 20 mg/dL    Creatinine 1.05 (H) 0.55 - 1.02 mg/dL    BUN/Creatinine ratio 22 (H) 12 - 20      GFR est AA >60 >60 ml/min/1.73m2    GFR est non-AA 53 (L) >60 ml/min/1.73m2    Calcium 8.9 8.5 - 10.1 mg/dL   TROPONIN-HIGH SENSITIVITY    Collection Time: 04/21/22  9:57 PM   Result Value Ref Range    Troponin-High Sensitivity 14 0 - 51 ng/L   HEPATIC FUNCTION PANEL    Collection Time: 04/21/22  9:57 PM   Result Value Ref Range    Protein, total 6.8 6.4 - 8.2 g/dL    Albumin 3.4 (L) 3.5 - 5.0 g/dL    Globulin 3.4 2.0 - 4.0 g/dL    A-G Ratio 1.0 (L) 1.1 - 2.2      Bilirubin, total 0.5 0.2 - 1.0 mg/dL    Bilirubin, direct 0.2 0.0 - 0.2 mg/dL    Alk.  phosphatase 69 45 - 117 U/L    AST (SGOT) 25 15 - 37 U/L    ALT (SGPT) 31 12 - 78 U/L   LIPASE    Collection Time: 04/21/22  9:57 PM   Result Value Ref Range    Lipase 108 73 - 393 U/L   EKG, 12 LEAD, INITIAL    Collection Time: 04/21/22 10:19 PM   Result Value Ref Range    Ventricular Rate 59 BPM    Atrial Rate 59 BPM    P-R Interval 162 ms    QRS Duration 66 ms    Q-T Interval 432 ms    QTC Calculation (Bezet) 427 ms    Calculated P Axis 45 degrees    Calculated R Axis 32 degrees    Calculated T Axis 46 degrees    Diagnosis       Sinus bradycardia  Possible Left atrial enlargement  Borderline ECG  No previous ECGs available  Confirmed by MARK PAULINO, Pravin Vásquez ([id number]) on 4/22/2022 11:46:36 AM     URINALYSIS W/ RFLX MICROSCOPIC    Collection Time: 04/21/22 11:00 PM   Result Value Ref Range    Color Yellow      Appearance Hazy (A) Clear      Specific gravity 1.015 1.003 - 1.030      pH (UA) 5.0 5.0 - 8.0      Protein Trace (A) Negative mg/dL    Glucose Negative Negative mg/dL    Ketone Negative Negative mg/dL    Bilirubin Negative Negative      Blood Negative Negative      Urobilinogen 0.1 (L) 0.2 - 1.0 EU/dL    Nitrites Negative Negative      Leukocyte Esterase Large (A) Negative     URINE MICROSCOPIC    Collection Time: 04/21/22 11:00 PM   Result Value Ref Range    WBC 10-20 0 - 4 /hpf    RBC 0-5 0 - 5 /hpf    Epithelial cells Moderate (A) Few /lpf    Bacteria 3+ (A) Negative /hpf    Mucus 1+ (A) Negative /lpf   RENAL FUNCTION PANEL    Collection Time: 04/22/22  6:23 AM   Result Value Ref Range    Sodium 142 136 - 145 mmol/L    Potassium 3.8 3.5 - 5.1 mmol/L    Chloride 110 (H) 97 - 108 mmol/L    CO2 28 21 - 32 mmol/L    Anion gap 4 (L) 5 - 15 mmol/L    Glucose 146 (H) 65 - 100 mg/dL    BUN 14 6 - 20 mg/dL    Creatinine 0.76 0.55 - 1.02 mg/dL    BUN/Creatinine ratio 18 12 - 20      GFR est AA >60 >60 ml/min/1.73m2    GFR est non-AA >60 >60 ml/min/1.73m2    Calcium 8.3 (L) 8.5 - 10.1 mg/dL    Phosphorus 3.1 2.6 - 4.7 mg/dL    Albumin 3.2 (L) 3.5 - 5.0 g/dL       History:  Past Medical History:   Diagnosis Date    HTN (hypertension)     Joint pain     Rheumatism     Thyroid disorder       Past Surgical History:   Procedure Laterality Date    HX HYSTERECTOMY        Prior to Admission medications    Medication Sig Start Date End Date Taking?  Authorizing Provider   amLODIPine-benazepril (LOTREL) 10-40 mg per capsule  2/24/22  Yes Other, MD Carol   clobetasoL (TEMOVATE) 0.05 % topical cream  4/18/22  Yes Other, MD Carol   omeprazole (PRILOSEC) 40 mg capsule  4/18/22  Yes Other, MD Carol   levothyroxine (SYNTHROID) 112 mcg tablet  4/18/22  Yes Other, MD Carol   tiZANidine (ZANAFLEX) 6 mg capsule  2/24/22  Yes Other, MD Carol   methotrexate 25 mg/mL chemo injection  3/24/22  Yes Other, MD Carol   folic acid (FOLVITE) 1 mg tablet folic acid 1 mg tablet   Yes Other, Phys, MD   Taltz Autoinjector 80 mg/mL auto injector  4/11/22  Yes Other, MD Carol DULoxetine (CYMBALTA) 60 mg capsule Take 60 mg by mouth daily. 2/24/22   Provider, Historical   ergocalciferol (DRISDOL) 50,000 unit capsule Take 1 Cap by mouth every seven (7) days. 9/21/12   Francisco Javier Iqbal MD   potassium chloride SR (KLOR-CON 10) 10 mEq tablet Take 20 mEq by mouth daily. Provider, Historical     Allergies   Allergen Reactions    Pcn [Penicillins] Itching      Social History     Tobacco Use    Smoking status: Never Smoker    Smokeless tobacco: Never Used   Substance Use Topics    Alcohol use: No      Family History   Problem Relation Age of Onset    Hypertension Mother         Current Medications:  Current Facility-Administered Medications   Medication Dose Route Frequency    pantoprazole (PROTONIX) tablet 40 mg  40 mg Oral ACB    levothyroxine (SYNTHROID) tablet 112 mcg  112 mcg Oral 6am    folic acid (FOLVITE) tablet 1 mg  1 mg Oral DAILY    DULoxetine (CYMBALTA) capsule 60 mg  60 mg Oral DAILY    sodium chloride (NS) flush 5-40 mL  5-40 mL IntraVENous Q8H    sodium chloride (NS) flush 5-40 mL  5-40 mL IntraVENous PRN    acetaminophen (TYLENOL) tablet 650 mg  650 mg Oral Q6H PRN    Or    acetaminophen (TYLENOL) suppository 650 mg  650 mg Rectal Q6H PRN    ondansetron (ZOFRAN ODT) tablet 4 mg  4 mg Oral Q6H PRN    Or    ondansetron (ZOFRAN) injection 4 mg  4 mg IntraVENous Q6H PRN    dextrose 5% and 0.9% NaCl infusion  125 mL/hr IntraVENous CONTINUOUS    cefTRIAXone (ROCEPHIN) 1 g in sterile water (preservative free) 10 mL IV syringe  1 g IntraVENous Q24H    enoxaparin (LOVENOX) injection 70 mg  1 mg/kg SubCUTAneous Q12H         Review of Systems:  Constitutional No fevers, chills, night sweats, excessive fatigue or weight loss. Allergic/Immunologic No recent allergic reactions   Eyes No significant visual difficulties. No diplopia. ENMT No problems with hearing, no sore throat, no sinus drainage. Endocrine No hot flashes or night sweats.  No cold intolerance, polyuria, or polydipsia   Hematologic/Lymphatic No easy bruising or bleeding. The patient denies any tender or palpable lymph nodes   Breasts No abnormal masses of breast, nipple discharge or pain. Respiratory No dyspnea on exertion, orthopnea, chest pain, cough or hemoptysis. Cardiovascular No anginal chest pain, irregular heart beat, tachycardia, palpitations or orthopnea. Gastrointestinal No nausea, vomiting, diarrhea, constipation, cramping, dysphagia, reflux, heartburn, GI bleeding, or early satiety. No change in bowel habits. Genitourinary (M) No hematuria, dysuria, increased frequency, urgency, hesitancy or incontinence. Musculoskeletal No joint pain, swelling or redness. No decreased range of motion. Integumentary No chronic rashes, inflammation, ulcerations, pruritus, petechiae, purpura, ecchymoses, or skin changes. Neurologic No headache, blurred vision, and no areas of focal weakness or numbness. Normal gait. No sensory problems. Psychiatric No insomnia, depression, jesusita or mood swings. No psychotropic drugs. Physical Exam:  Constitutional Alert, cooperative, oriented. Mood and affect appropriate. Appears close to chronological age. Well nourished. Well developed. Head Normocephalic; no scars   Eyes Conjunctivae and sclerae are clear and without icterus. Pupils are reactive and equal.   ENMT Sinuses are nontender. No oral exudates, ulcers, masses, thrush or mucositis. Oropharynx clear. Tongue normal.   Neck Supple without masses or thyromegaly. No jugular venous distension. Hematologic/Lymphatic No petechiae or purpura. No tender or palpable lymph nodes in the cervical, supraclavicular, axillary or inguinal area. Respiratory Lungs are clear to auscultation without rhonchi or wheezing. Cardiovascular Regular rate and rhythm of heart without murmurs, gallops or rubs. Chest / Line Site Chest is symmetric with no chest wall deformities.    Abdomen Non-tender, non-distended, no masses, ascites or hepatosplenomegaly. Good bowel sounds. No guarding or rebound tenderness. No pulsatile masses. Musculoskeletal No tenderness or swelling, normal range of motion without obvious weakness. Extremities No visible deformities, no cyanosis, clubbing or edema. Skin No rashes, scars, or lesions suggestive of malignancy. No petechiae, purpura, or ecchymoses. No excoriations. Neurologic No sensory or motor deficits, normal cerebellar function, normal gait, cranial nerves intact. Psychiatric Alert and oriented times three. Coherent speech. Verbalizes understanding of our discussions today.

## 2022-04-22 NOTE — ED NOTES
SBAR called to Sherren Pippin, RN. Patient being transferred to room 570, 5 [facility].   Patient ambulated to restroom and stretcher, A/O x4, respirations even and unlabored. Patient's belongings were sent home with son. No further questions verbalized.

## 2022-04-22 NOTE — ED TRIAGE NOTES
Patient presents with hypotension and lower back pain. Patient took blood pressure at home 80/52. Started taking Tizanidine today.

## 2022-04-22 NOTE — ED NOTES
Patient laying on stretcher resting with son at bedside. Call bell in reach, side rail x1. Patient a/o x4.

## 2022-04-22 NOTE — H&P
History and Physical              Subjective :   Chief Complaint : Felt weak, found with hypotension. Source of information : Patient, ED provider    History of present illness:   73 y.o. female with history of hypertension, hypothyroidism and psoriatic arthritis on immunosuppressive treatment presents to the emergency room complaining of not feeling good and found with hypotension at home. She was given a new medication called tizanidine, took a dose today. She feels that this might have caused her the issues. Complaining of feeling gassy and bloated and abdominal discomfort, felt it is because of the diet that she is following due to Hinduism reasons. Denies any fever, chills. No chest pain, palpitations, cough or trouble breathing. Evaluation in the emergency room on arrival she was hypotensive, with fluids improved. Laboratory data with no significant abnormalities. CT of the abdomen done suggestive of peripancreatic changes encasing the mesenteric vessels also nonocclusive superior mesenteric thrombus. Past Medical History:   Diagnosis Date    HTN (hypertension)     Joint pain     Thyroid disorder    Psoriatic arthritis    Past Surgical History:   Procedure Laterality Date    HX HYSTERECTOMY       Family History   Problem Relation Age of Onset    Hypertension Mother       Social History     Tobacco Use    Smoking status: Never Smoker    Smokeless tobacco: Never Used   Substance Use Topics    Alcohol use: No       Prior to Admission medications    Medication Sig Start Date End Date Taking?  Authorizing Provider   amLODIPine-benazepril (LOTREL) 10-40 mg per capsule  2/24/22  Yes Other, MD Carol   clobetasoL (TEMOVATE) 0.05 % topical cream  4/18/22  Yes Other, MD Carol   omeprazole (PRILOSEC) 40 mg capsule  4/18/22  Yes Other, MD Carol   levothyroxine (SYNTHROID) 112 mcg tablet  4/18/22  Yes Other, MD Carol   tiZANidine (ZANAFLEX) 6 mg capsule  2/24/22  Yes Other, MD Carol   methotrexate 25 mg/mL chemo injection  3/24/22  Yes Other, MD Carol   folic acid (FOLVITE) 1 mg tablet folic acid 1 mg tablet   Yes Other, MD Carol   Taltz Autoinjector 80 mg/mL auto injector  4/11/22  Yes Other, MD Carol   DULoxetine (CYMBALTA) 60 mg capsule Take 60 mg by mouth daily. 2/24/22   Provider, Historical   ergocalciferol (DRISDOL) 50,000 unit capsule Take 1 Cap by mouth every seven (7) days. 9/21/12   Jair Payne MD   potassium chloride SR (KLOR-CON 10) 10 mEq tablet Take 20 mEq by mouth daily. Provider, Historical   etanercept (ENBREL) 50 mg/mL (0.98 mL) injection 50 mg by SubCUTAneous route every seven (7) days. Provider, Historical     Allergies   Allergen Reactions    Pcn [Penicillins] Itching             Review of Systems:  Constitutional: Appetite is fair. Denies weight loss, no fever, no chills, no night sweats. Eye: No recent visual disturbances, no discharge, no double vision. Ear/nose/mouth/throat : No hearing disturbance, no ear pain, no nasal congestion, no sore throat, no trouble swallowing. Respiratory : No trouble breathing, no cough, no shortness of breath, no hemoptysis, no wheezing. Cardiovascular : No chest pain, no palpitation,  no orthopnea, no peripheral edema. Gastrointestinal : As in history of present illness. No nausea, no vomiting, no diarrhea, No constipation, No heartburn, No abdominal pain. Genitourinary : No dysuria, no hematuria, no increased frequency, No incontinence. Lymphatics : No swollen glands -Neck, axillary, inguinal.  Endocrine : No excessive thirst, No polyuria No cold intolerance, No heat intolerance. Immunologic : No hives, urticaria, No seasonal allergies. Musculoskeletal : No joint swelling, No pain, No effusion,  No back pain, No neck pain. Integumentary : No rash, No pruritus, . Hematology : No petechiae, No easy bruising,  No tendency to bleed easy.   Neurology : Denies change in mental status, No abnormal balance, No headache, No confusion, No numbness or tingling. Psychiatric : No mood swings, No anxiety, No depression. Vitals:     Patient Vitals for the past 12 hrs:   Temp Pulse Resp BP SpO2   04/22/22 0242 98.3 °F (36.8 °C) 69 15 138/83 100 %   04/22/22 0121  66 18 124/77 100 %   04/22/22 0022  (!) 49 18 (!) 96/57 99 %   04/21/22 2316  (!) 48  105/62 99 %   04/21/22 2239  (!) 51  (!) 85/59 98 %   04/21/22 2211  60  (!) 84/59 98 %   04/21/22 2156 97.7 °F (36.5 °C) 65 18 (!) 85/60 100 %       Physical Exam:   General : Looks comfortable, no respiratory distress noted. HEENT : PERRLA, normal oral mucosa, atraumatic normocephalic, Normal ear and nose. Neck : Supple, no JVD, no masses noted, no carotid bruit. Lungs : Breath sounds with good air entry bilaterally, no wheezes or rales, no accessory muscle use. CVS : Rhythm rate regular, S1+, S2+, no murmur or gallop. Abdomen : Soft, nontender, no organomegaly, bowel sounds active. Extremities : No edema noted,  pedal pulses palpable. Musculoskeletal : Fair range of motion, no joint swelling or effusion, muscle tone and power appears normal.   Skin : Moist, warm, no pathological rash. Lymphatic : No cervical lymphadenopathy. Neurological : Awake, alert, oriented to time place person. Psychiatric : Mood and affect appears appropriate to the situation. Data Review:   Recent Results (from the past 24 hour(s))   CBC WITH AUTOMATED DIFF    Collection Time: 04/21/22  9:57 PM   Result Value Ref Range    WBC 9.2 3.6 - 11.0 K/uL    RBC 3.70 (L) 3.80 - 5.20 M/uL    HGB 11.3 (L) 11.5 - 16.0 g/dL    HCT 33.4 (L) 35.0 - 47.0 %    MCV 90.3 80.0 - 99.0 FL    MCH 30.5 26.0 - 34.0 PG    MCHC 33.8 30.0 - 36.5 g/dL    RDW 13.3 11.5 - 14.5 %    PLATELET 579 175 - 901 K/uL    MPV 9.7 8.9 - 12.9 FL    NEUTROPHILS 67 32 - 75 %    LYMPHOCYTES 25 12 - 49 %    MONOCYTES 6 5 - 13 %    EOSINOPHILS 1 0 - 7 %    BASOPHILS 0 0 - 1 %    IMMATURE GRANULOCYTES 1 (H) 0.0 - 0.5 %    ABS.  NEUTROPHILS 6.2 1.8 - 8.0 K/UL    ABS. LYMPHOCYTES 2.3 0.8 - 3.5 K/UL    ABS. MONOCYTES 0.6 0.0 - 1.0 K/UL    ABS. EOSINOPHILS 0.1 0.0 - 0.4 K/UL    ABS. BASOPHILS 0.0 0.0 - 0.1 K/UL    ABS. IMM. GRANS. 0.1 (H) 0.00 - 0.04 K/UL    DF AUTOMATED     METABOLIC PANEL, BASIC    Collection Time: 04/21/22  9:57 PM   Result Value Ref Range    Sodium 140 136 - 145 mmol/L    Potassium 4.0 3.5 - 5.1 mmol/L    Chloride 104 97 - 108 mmol/L    CO2 27 21 - 32 mmol/L    Anion gap 9 5 - 15 mmol/L    Glucose 148 (H) 65 - 100 mg/dL    BUN 23 (H) 6 - 20 mg/dL    Creatinine 1.05 (H) 0.55 - 1.02 mg/dL    BUN/Creatinine ratio 22 (H) 12 - 20      GFR est AA >60 >60 ml/min/1.73m2    GFR est non-AA 53 (L) >60 ml/min/1.73m2    Calcium 8.9 8.5 - 10.1 mg/dL   TROPONIN-HIGH SENSITIVITY    Collection Time: 04/21/22  9:57 PM   Result Value Ref Range    Troponin-High Sensitivity 14 0 - 51 ng/L   HEPATIC FUNCTION PANEL    Collection Time: 04/21/22  9:57 PM   Result Value Ref Range    Protein, total 6.8 6.4 - 8.2 g/dL    Albumin 3.4 (L) 3.5 - 5.0 g/dL    Globulin 3.4 2.0 - 4.0 g/dL    A-G Ratio 1.0 (L) 1.1 - 2.2      Bilirubin, total 0.5 0.2 - 1.0 mg/dL    Bilirubin, direct 0.2 0.0 - 0.2 mg/dL    Alk.  phosphatase 69 45 - 117 U/L    AST (SGOT) 25 15 - 37 U/L    ALT (SGPT) 31 12 - 78 U/L   LIPASE    Collection Time: 04/21/22  9:57 PM   Result Value Ref Range    Lipase 108 73 - 393 U/L   URINALYSIS W/ RFLX MICROSCOPIC    Collection Time: 04/21/22 11:00 PM   Result Value Ref Range    Color Yellow      Appearance Hazy (A) Clear      Specific gravity 1.015 1.003 - 1.030      pH (UA) 5.0 5.0 - 8.0      Protein Trace (A) Negative mg/dL    Glucose Negative Negative mg/dL    Ketone Negative Negative mg/dL    Bilirubin Negative Negative      Blood Negative Negative      Urobilinogen 0.1 (L) 0.2 - 1.0 EU/dL    Nitrites Negative Negative      Leukocyte Esterase Large (A) Negative     URINE MICROSCOPIC    Collection Time: 04/21/22 11:00 PM   Result Value Ref Range    WBC 10-20 0 - 4 /hpf    RBC 0-5 0 - 5 /hpf    Epithelial cells Moderate (A) Few /lpf    Bacteria 3+ (A) Negative /hpf    Mucus 1+ (A) Negative /lpf       Radiologic Studies :   CT Results  (Last 48 hours)               04/21/22 2306  CT ABD PELV W CONT Final result    Impression:  Abnormal pancreas with peripancreatic changes extending into the   mesentery and encasing the mesenteric vessels. There is associated dilatation of   the main pancreatic duct in the tail of the pancreas and dilatation of the   biliary tract. There is also nonocclusive thrombus in the superior mesenteric   vein. Findings are highly suggestive of pancreatic carcinoma although complex   pancreatitis with central pancreatic necrosis might conceivably give this   appearance. Narrative:  PROCEDURE: CT ABD PELV W CONT       HISTORY:Lower back pain, hypotension       COMPARISON:None       Department policy stipulates all CT scans at this facility are performed using   dose reduction optimization techniques as appropriate to the performed exam,   including the following: Automated exposure control, adjustments of the mA   and/or KVP according to the patient size, and the use of iterative   reconstruction technique. LIMITATIONS: None       TECHNIQUE: Axial images with multiplanar reconstruction following intravenous   contrast.100 mL [id number]       CHEST: No acute airspace process or pleural effusion seen at the lung bases. LIVER: There are multiple hypodense lesions in the liver with sharply defined   margins. The largest is 2.6 cm in diameter and measures water density. The portal vein is patent. In the region of the pancreas there is narrowing of   the splenic vein and the portal vein associated with irregular soft tissue   density encasing the venous structures as well as the celiac vein and the   superior mesenteric vein. Nonocclusive thrombus is seen in the superior mesenteric vein just proximal to   the pancreas. GALLBLADDER: Gallbladder is distended. No stones visualized. BILIARY TREE: Common bile duct is dilated up to 12 mm in diameter. Intrahepatic   biliary tract is mildly dilated. Distal common bile duct terminates abruptly. PANCREAS: There is stranding in the fat planes around the pancreas. The mid body   of the pancreas shows diminished enhancement. In the tail the pancreas the main   pancreatic duct is dilated. In the head of the pancreas there is a 13 x 22 mm   hypodense lesion. SPLEEN: Normal   ADRENAL GLANDS: Normal   KIDNEYS/URETERS/BLADDER: Normal   RETROPERITONEUM/AORTA: Aorta shows no evidence of aneurysm or dissection. No   periaortic adenopathy. BOWEL/MESENTERY: Stomach, small bowel and colon are unremarkable. There is   stranding in the central mesenteric fat. APPENDIX: Identified and normal   PERITONEAL CAVITY: Small amounts of free intraperitoneal fluid. No free air   REPRODUCTIVE ORGANS: Removed   BONE/TISSUES: No acute abnormality. OTHER: None                      Assessment and Plan :     Hypotension: Likely secondary to volume contraction. Responded well to the fluids, we will continue maintenance fluids and encourage oral fluid intake. Benign essential hypertension: On amlodipine with benazepril combination, we will hold temporarily and restart when condition is appropriate or adjust as condition dictates. Abnormal pancreas with mesenteric vein thrombus: Etiology unclear, suspicious for malignancy need further work-up. Requested oncology and gastroenterology consultation. Hypothyroidism: On supplementation, will continue with no change    Psoriatic arthritis: On immunosuppressive treatment with regular follow-up with rheumatology. No further interventions needed at this time. Admitted for observation to medical floor with telemetry, full CODE STATUS, home medications reviewed and verified with patient.       CC : Martell Carrillo MD  Signed By: Franklyn Burris MD     April 22, 2022      This dictation was done by [employer] voice recognition software. Often unanticipated grammatical, syntax, Roland phones and other interpretive errors are inadvertently transcribed. Please excuse errors that have escaped final proofreading.

## 2022-04-23 VITALS
HEIGHT: 63 IN | WEIGHT: 150 LBS | TEMPERATURE: 98.3 F | DIASTOLIC BLOOD PRESSURE: 75 MMHG | SYSTOLIC BLOOD PRESSURE: 142 MMHG | RESPIRATION RATE: 17 BRPM | HEART RATE: 81 BPM | BODY MASS INDEX: 26.58 KG/M2 | OXYGEN SATURATION: 95 %

## 2022-04-23 PROCEDURE — 74011250637 HC RX REV CODE- 250/637: Performed by: HOSPITALIST

## 2022-04-23 PROCEDURE — 36415 COLL VENOUS BLD VENIPUNCTURE: CPT

## 2022-04-23 PROCEDURE — G0378 HOSPITAL OBSERVATION PER HR: HCPCS

## 2022-04-23 PROCEDURE — 96372 THER/PROPH/DIAG INJ SC/IM: CPT

## 2022-04-23 PROCEDURE — 74011250636 HC RX REV CODE- 250/636: Performed by: INTERNAL MEDICINE

## 2022-04-23 PROCEDURE — 96376 TX/PRO/DX INJ SAME DRUG ADON: CPT

## 2022-04-23 PROCEDURE — 86301 IMMUNOASSAY TUMOR CA 19-9: CPT

## 2022-04-23 PROCEDURE — 74011000250 HC RX REV CODE- 250: Performed by: HOSPITALIST

## 2022-04-23 PROCEDURE — 74011250636 HC RX REV CODE- 250/636: Performed by: HOSPITALIST

## 2022-04-23 RX ORDER — LEVOFLOXACIN 500 MG/1
500 TABLET, FILM COATED ORAL DAILY
Qty: 7 TABLET | Refills: 0 | Status: SHIPPED | OUTPATIENT
Start: 2022-04-23

## 2022-04-23 RX ORDER — ACETAMINOPHEN 325 MG/1
650 TABLET ORAL
Qty: 60 TABLET | Refills: 0 | Status: SHIPPED | OUTPATIENT
Start: 2022-04-23

## 2022-04-23 RX ADMIN — SODIUM CHLORIDE, PRESERVATIVE FREE 10 ML: 5 INJECTION INTRAVENOUS at 04:56

## 2022-04-23 RX ADMIN — AMLODIPINE BESYLATE 10 MG: 5 TABLET ORAL at 08:36

## 2022-04-23 RX ADMIN — LISINOPRIL 40 MG: 40 TABLET ORAL at 08:35

## 2022-04-23 RX ADMIN — DULOXETINE 60 MG: 30 CAPSULE, DELAYED RELEASE ORAL at 08:35

## 2022-04-23 RX ADMIN — ENOXAPARIN SODIUM 70 MG: 100 INJECTION SUBCUTANEOUS at 04:56

## 2022-04-23 RX ADMIN — CEFTRIAXONE 1 G: 1 INJECTION, POWDER, FOR SOLUTION INTRAMUSCULAR; INTRAVENOUS at 08:34

## 2022-04-23 RX ADMIN — PANTOPRAZOLE SODIUM 40 MG: 40 TABLET, DELAYED RELEASE ORAL at 04:56

## 2022-04-23 RX ADMIN — ACETAMINOPHEN 650 MG: 325 TABLET ORAL at 08:34

## 2022-04-23 RX ADMIN — FOLIC ACID 1 MG: 1 TABLET ORAL at 08:36

## 2022-04-23 RX ADMIN — LEVOTHYROXINE SODIUM 112 MCG: 0.11 TABLET ORAL at 04:56

## 2022-04-23 NOTE — DISCHARGE SUMMARY
Hospitalist Discharge Summary     Patient ID:  Kishor Cooney  [id number]  13 y.o.  1955 4/21/2022    PCP on record: García Boston MD    Admit date: 4/21/2022  Discharge date and time: 4/23/2022    DISCHARGE DIAGNOSIS:    Hypotension/abnormal pancreas/mesenteric vein thrombosis/hypothyroidism/cirrhotic arthritis    CONSULTATIONS:  IP CONSULT TO ONCOLOGY  IP CONSULT TO GASTROENTEROLOGY    Excerpted HPI from H&P of Myrna Sanford MD:  73 y.o. female with history of hypertension, hypothyroidism and psoriatic arthritis on immunosuppressive treatment presents to the emergency room complaining of not feeling good and found with hypotension at home. She was given a new medication called tizanidine, took a dose today. She feels that this might have caused her the issues. Complaining of feeling gassy and bloated and abdominal discomfort, felt it is because of the diet that she is following due to Jew reasons. Denies any fever, chills. No chest pain, palpitations, cough or trouble breathing. Evaluation in the emergency room on arrival she was hypotensive, with fluids improved. Laboratory data with no significant abnormalities. CT of the abdomen done suggestive of peripancreatic changes encasing the mesenteric vessels also nonocclusive superior mesenteric thrombus. ______________________________________________________________________  DISCHARGE SUMMARY/HOSPITAL COURSE:  for full details see H&P, daily progress notes, labs, consult notes.      Patient was subsequently admitted to [facility] for further evaluation as well as management, patient was started on therapeutic Lovenox which was transitioned to Eliquis, patient received MRI of abdomen, patient's blood pressure remained normal, patient's clinical status overall improved, following which patient was deemed stable for discharge with outpatient follow-up with oncology as well as gastroenterology, patient to be discharged on oral antibiotics for urinary tract infection, plan was explained the patient in detail was agreeable to current plan        _______________________________________________________________________  Patient seen and examined by me on discharge day. Pertinent Findings:  Gen:    Not in distress  Chest: Clear lungs  CVS:   Regular rhythm, s1/s2 no m/r/g  No edema  Abd:  Soft, not distended, not tender  Neuro:  Alert, Oriented x 4  _______________________________________________________________________  DISCHARGE MEDICATIONS:   Current Discharge Medication List      START taking these medications    Details   acetaminophen (TYLENOL) 325 mg tablet Take 2 Tablets by mouth every six (6) hours as needed for Pain or Fever. Qty: 60 Tablet, Refills: 0  Start date: 4/23/2022      levoFLOXacin (Levaquin) 500 mg tablet Take 1 Tablet by mouth daily. Qty: 7 Tablet, Refills: 0  Start date: 4/23/2022      apixaban (Eliquis) 5 mg tablet Take 1 Tablet by mouth two (2) times a day. Qty: 60 Tablet, Refills: 0  Start date: 4/23/2022         CONTINUE these medications which have NOT CHANGED    Details   amLODIPine-benazepril (LOTREL) 10-40 mg per capsule       clobetasoL (TEMOVATE) 0.05 % topical cream       omeprazole (PRILOSEC) 40 mg capsule       levothyroxine (SYNTHROID) 112 mcg tablet       methotrexate 25 mg/mL chemo injection       folic acid (FOLVITE) 1 mg tablet folic acid 1 mg tablet      Taltz Autoinjector 80 mg/mL auto injector       DULoxetine (CYMBALTA) 60 mg capsule Take 60 mg by mouth daily. ergocalciferol (DRISDOL) 50,000 unit capsule Take 1 Cap by mouth every seven (7) days. Qty: 8 Cap, Refills: 3      potassium chloride SR (KLOR-CON 10) 10 mEq tablet Take 20 mEq by mouth daily. Associated Diagnoses: Other specified acquired hypothyroidism;  Unspecified vitamin D deficiency         STOP taking these medications       tiZANidine (ZANAFLEX) 6 mg capsule Comments:   Reason for Stopping: Olmesartan-Amlodipine-HCTZ (TRIBENZOR) 40-10-25 mg Tab Comments:   Reason for Stopping:                 Patient Follow Up Instructions: Activity: Activity as tolerated  Diet: Cardiac Diet  Wound Care: None needed    Follow-up with PCP/Gastroenterology/Oncology in 2 weeks. Follow-up tests/labs As per above physicians  Follow-up Information     Follow up With Specialties Details Why Contact Info Adriane Essex, MD Internal Medicine On 4/28/2022 @ 10:15 am [facility] 4      Williams Moya MD Hematology and Oncology In 1 week  [facility] [address] [place] [address] [id number] [place] [address]      Juaquin Mcpherson MD Gastroenterology, Internal Medicine In 1 week  [address] [facility]  [phone]          ________________________________________________________________    Risk of deterioration: Low    Condition at Discharge:  Stable  __________________________________________________________________    Disposition  Home with family, no needs    ____________________________________________________________________    Code Status: Full Code  ___________________________________________________________________      Total time in minutes spent coordinating this discharge (includes going over instructions, follow-up, prescriptions, and preparing report for sign off to her PCP) :  45 minutes    Signed:   Farhana Shin MD

## 2022-04-24 LAB — CANCER AG19-9 SERPL-ACNC: 4 U/ML (ref 0–35)

## 2022-04-29 ENCOUNTER — TRANSCRIBE ORDER (OUTPATIENT)
Dept: SCHEDULING | Age: 67
End: 2022-04-29

## 2022-04-29 DIAGNOSIS — C25.0 MALIGNANT NEOPLASM OF HEAD OF PANCREAS (HCC): Primary | ICD-10-CM

## 2022-05-09 ENCOUNTER — HOSPITAL ENCOUNTER (OUTPATIENT)
Dept: PET IMAGING | Age: 67
Discharge: HOME | End: 2022-05-09
Attending: INTERNAL MEDICINE
Payer: COMMERCIAL

## 2022-05-09 DIAGNOSIS — C25.0 MALIGNANT NEOPLASM OF HEAD OF PANCREAS (HCC): ICD-10-CM

## 2022-05-09 PROCEDURE — A9552 F18 FDG: HCPCS

## 2022-05-09 RX ADMIN — FLUDEOXYGLUCOSE F-18 8.25 MILLICURIE: 200 INJECTION INTRAVENOUS at 16:25

## 2022-05-10 RX ORDER — FLUDEOXYGLUCOSE F-18 200 MCI/ML
8.25 INJECTION INTRAVENOUS ONCE
Status: COMPLETED | OUTPATIENT
Start: 2022-05-10 | End: 2022-05-09

## 2022-05-20 ENCOUNTER — TRANSCRIBE ORDER (OUTPATIENT)
Dept: SCHEDULING | Age: 67
End: 2022-05-20

## 2022-05-20 DIAGNOSIS — C25.0 MALIGNANT NEOPLASM OF HEAD OF PANCREAS (HCC): Primary | ICD-10-CM

## 2022-06-03 ENCOUNTER — TRANSCRIBE ORDER (OUTPATIENT)
Dept: SCHEDULING | Age: 67
End: 2022-06-03

## 2022-06-03 DIAGNOSIS — C25.0 MALIGNANT NEOPLASM OF HEAD OF PANCREAS (HCC): Primary | ICD-10-CM

## 2022-06-10 ENCOUNTER — TELEPHONE (OUTPATIENT)
Dept: PHARMACY | Age: 67
End: 2022-06-10

## 2022-06-10 NOTE — TELEPHONE ENCOUNTER
Medication Management Service    Date: 6/10/2022  Patient's Name: Shy Dodd YOB: 1955            _____________________________________________________________________________________________      Left message to schedule appointment for SMS. Will continue to outreach as appropriate.     Melissa Luna, PharmD [facility]  Ambulatory Clinical Pharmacist  Specialty Medication Services  Phone: [phone]

## 2022-06-22 ENCOUNTER — TELEPHONE (OUTPATIENT)
Dept: PHARMACY | Age: 67
End: 2022-06-22

## 2022-06-22 NOTE — TELEPHONE ENCOUNTER
Medication Management Service    Date: 6/22/2022  Patient's Name: Mayito Sheldon YOB: 1955            _____________________________________________________________________________________________      Left message to schedule initial Specialty Medication PharmD review. Please return call: [phone] option#4.     Rebecca Gaffney PharmD [facility]  Ambulatory Clinical Pharmacist  Specialty Medication Services  Phone: [phone]

## 2022-07-05 ENCOUNTER — HOSPITAL ENCOUNTER (OUTPATIENT)
Dept: MRI IMAGING | Age: 67
Discharge: HOME | End: 2022-07-05
Attending: INTERNAL MEDICINE
Payer: COMMERCIAL

## 2022-07-05 DIAGNOSIS — C25.0 MALIGNANT NEOPLASM OF HEAD OF PANCREAS (HCC): ICD-10-CM

## 2022-07-05 PROCEDURE — A9577 INJ MULTIHANCE: HCPCS | Performed by: INTERNAL MEDICINE

## 2022-07-05 PROCEDURE — 74183 MRI ABD W/O CNTR FLWD CNTR: CPT

## 2022-07-05 PROCEDURE — 74011250636 HC RX REV CODE- 250/636: Performed by: INTERNAL MEDICINE

## 2022-07-05 RX ADMIN — GADOBENATE DIMEGLUMINE 13 ML: 529 INJECTION, SOLUTION INTRAVENOUS at 16:35

## 2022-07-18 ENCOUNTER — TRANSCRIBE ORDER (OUTPATIENT)
Dept: SCHEDULING | Age: 67
End: 2022-07-18

## 2022-07-18 DIAGNOSIS — C25.0 MALIGNANT NEOPLASM OF HEAD OF PANCREAS (HCC): Primary | ICD-10-CM

## 2022-07-25 ENCOUNTER — TELEPHONE (OUTPATIENT)
Dept: PHARMACY | Age: 67
End: 2022-07-25

## 2022-07-25 NOTE — TELEPHONE ENCOUNTER
Medication Management Service    Date: 7/25/2022  Patient's Name: Meryle Budd YOB: 1955            _____________________________________________________________________________________________          Left message to schedule initial Specialty Medication PharmD review. Please return call: [phone] option#4.     Sherry Scott PharmD [facility]  Ambulatory Clinical Pharmacist  Specialty Medication Services  Phone: [phone]

## 2022-08-01 ENCOUNTER — TELEPHONE (OUTPATIENT)
Dept: PHARMACY | Age: 67
End: 2022-08-01

## 2022-08-01 NOTE — TELEPHONE ENCOUNTER
Medication Management Service    Date: 8/1/2022  Patient's Name: Kat Mccord YOB: 1955            _____________________________________________________________________________________________          Left message to reschedule initial Specialty Medication PharmD review. Original visit was scheduled for 07/25/22, but patient no show. Please return call: [phone] option#4.     Tiffany Barton PharmD [facility]  Ambulatory Clinical Pharmacist  Specialty Medication Services  Phone: [phone]

## 2022-08-03 ENCOUNTER — TELEPHONE (OUTPATIENT)
Dept: PHARMACY | Age: 67
End: 2022-08-03

## 2022-08-03 NOTE — TELEPHONE ENCOUNTER
Medication Management Service    Date: 8/3/2022  Patient's Name: Mckay Alegria YOB: 1955            _____________________________________________________________________________________________          Left message to reschedule initial Specialty Medication PharmD review. Original visit was scheduled for 07/25/22, but patient no show. Please return call: [phone] option#4.     Miri FishmanD [facility]  Ambulatory Clinical Pharmacist  Specialty Medication Services  Phone: [phone]

## 2022-08-05 ENCOUNTER — TELEPHONE (OUTPATIENT)
Dept: PHARMACY | Age: 67
End: 2022-08-05

## 2022-08-05 NOTE — TELEPHONE ENCOUNTER
Medication Management Service    Date: 8/5/2022  Patient's Name: Papito Concepcion YOB: 1955            _____________________________________________________________________________________________          Left message to reschedule initial Specialty Medication PharmD review. Please return call: [phone] option#4.     Miri NicoleD [facility]  Ambulatory Clinical Pharmacist  Specialty Medication Services  Phone: [phone]

## 2022-09-06 ENCOUNTER — TELEPHONE (OUTPATIENT)
Dept: PHARMACY | Age: 67
End: 2022-09-06

## 2022-09-06 NOTE — TELEPHONE ENCOUNTER
Specialty Medication Service    Date: 9/6/2022  Patient's Name: Nu Little YOB: 1955            _____________________________________________________________________________________________    Left message to reschedule Initial [facility] PharmD appointment for Specialty Medication Services. Please call: [phone] option 4. Will continue to outreach as appropriate. Thank you,  Yony Arechiga.  KULDIP MeansS  Clinical Pharmacist   [facility] Medication Management & Specialty Medication Service  Phone: [phone]        For Pharmacy [address] [facility] in place: No  Recommendation Provided To: Patient/Caregiver: 1 via Telephone  Intervention Detail: Scheduled Appointment  Gap Closed?: No  Intervention Accepted By: Patient/Caregiver: 0  Time Spent (min): 5

## 2022-09-08 NOTE — TELEPHONE ENCOUNTER
Specialty Medication Service    Date: 9/8/2022  Patient's Name: Remington Montoya YOB: 1955            _____________________________________________________________________________________________    Left message to reschedule Initial [facility] PharmD appointment for Specialty Medication Services. Please call: [phone] option 4. Will continue to outreach as appropriate. Thank you,  Alen Carias.  Rambo Cm, [address] [place] [address] Medication Management & Specialty Medication Service  Phone: [phone]        For Pharmacy [address] [place] [address] in place: No  Recommendation Provided To: Patient/Caregiver: 1 via Telephone  Intervention Detail: Scheduled Appointment  Intervention Accepted By: Patient/Caregiver: 0  Time Spent (min): 5

## 2022-09-09 NOTE — TELEPHONE ENCOUNTER
Specialty Medication Service    Date: 9/9/2022  Patient's Name: Mery Gomez YOB: 1955            _____________________________________________________________________________________________    Left message to reschedule Initial [facility] PharmD  appointment for Specialty Medication Services. Please call: [phone] option 4. Will continue to outreach as appropriate.       April Carrasco, PharmD [facility]  Ambulatory Clinical Pharmacist  Specialty Medication Services  Phone: [phone]     For Pharmacy [address] in place: No  Recommendation Provided To: Patient/Caregiver: 1 via Telephone  Intervention Detail: Scheduled Appointment  Intervention Accepted By: Patient/Caregiver: 0  Time Spent (min): 5

## 2022-09-27 ENCOUNTER — TELEPHONE (OUTPATIENT)
Dept: PHARMACY | Age: 67
End: 2022-09-27

## 2022-09-27 NOTE — TELEPHONE ENCOUNTER
Specialty Medication Service    Date: 9/27/2022  Patient's Name: Rosa Stewart YOB: 1955            _____________________________________________________________________________________________    Left message to schedule Initial [facility] PharmD  appointment for Specialty Medication Services. Please call: [phone] option 4. Will continue to outreach as appropriate. Thank you,  Neeraj Hathaway.  Roberto Sep, [address] [facility] Medication Management & Specialty Medication Service  Phone: [phone]

## 2022-09-29 NOTE — TELEPHONE ENCOUNTER
Specialty Medication Service    Date: 9/29/2022  Patient's Name: Del Brandt YOB: 1955            _____________________________________________________________________________________________    Left message to schedule Initial [facility] PharmD  appointment for Specialty Medication Services. Please call: [phone] option 4. Will continue to outreach as appropriate. Thank you,  Loretta Colon.  Toyin Garcia, [address] [place] [address] Medication Management & Specialty Medication Service  Phone: [phone]

## 2022-10-03 NOTE — TELEPHONE ENCOUNTER
Specialty Medication Service    Date: 10/3/2022  Patient's Name: Martha Vaca YOB: 1955            _____________________________________________________________________________________________    Left message to reschedule Initial [facility] PharmD  appointment for Specialty Medication Services. Please call: [phone] option 4. Will continue to outreach as appropriate.       Miri SauerD [facility]  Ambulatory Clinical Pharmacist  Specialty Medication Services  Phone: [phone]     For Pharmacy [address] [facility] in place: No  Recommendation Provided To: Patient/Caregiver: 1 via Telephone  Intervention Detail: Scheduled Appointment  Intervention Accepted By: Patient/Caregiver: 0  Time Spent (min): 15

## 2022-10-25 ENCOUNTER — TRANSCRIBE ORDER (OUTPATIENT)
Dept: SCHEDULING | Age: 67
End: 2022-10-25

## 2022-10-25 DIAGNOSIS — C25.0 MALIGNANT NEOPLASM OF HEAD OF PANCREAS (HCC): Primary | ICD-10-CM

## 2022-11-09 ENCOUNTER — TELEPHONE (OUTPATIENT)
Dept: PHARMACY | Age: 67
End: 2022-11-09

## 2022-11-09 NOTE — LETTER
2022 9:09 AM      Tamiko Jacobs NP,    I am a pharmacist with the Specialty Medication Service offered through [facility] [place] and we have mutual patient: Elvira Eubanks ( 1955). I am scheduled to speak with the patient on  to discuss their [place]. If the patient has a signed Release of Information ([place]) form on file, please fax any office visit notes to our office at [phone]  so we can add it to the patients chart with [facility]            Thank you in advance,       Jose Su PharmD  Ambulatory Clinical Pharmacist  Specialty Medication Service  Telephone: [phone] option 4   Fax: [phone]   Email: [email or web address]

## 2022-11-09 NOTE — TELEPHONE ENCOUNTER
Specialty Medication Service    Date: 11/9/2022  Patient's Name: Sadi Lucio YOB: 1955            _____________________________________________________________________________________________    Initial [facility] PharmD visit scheduled for 11/14/2022. Notes requested from specialist via fax.        Faith Mcqueen CP  Clinical    Specialty Medication Service   [phone] option Eric Jauregui [address] in place: No  Recommendation Provided To: Provider: 1 via Fax sent to office  and Patient/Caregiver: 1 via Telephone  Intervention Detail: Scheduled Appointment  Intervention Accepted By: Provider: 0 and Patient/Caregiver: 1  Time Spent (min): 10

## 2022-11-16 ENCOUNTER — TELEPHONE (OUTPATIENT)
Dept: PHARMACY | Age: 67
End: 2022-11-16

## 2022-11-16 NOTE — TELEPHONE ENCOUNTER
Specialty Medication Service    Date: 11/16/2022  Patient's Name: Max Newsome YOB: 1955            _____________________________________________________________________________________________    Theora Brood to leave message to reschedule Initial [facility] PharmD  appointment for Specialty Medication Services. Please call: [phone] option 4. Will continue to outreach as appropriate.       Sheyla French, PharmD [facility]  Ambulatory Clinical Pharmacist  Specialty Medication Services  Phone: [phone]

## 2022-11-17 NOTE — TELEPHONE ENCOUNTER
Specialty Medication Service    Date: 11/17/2022  Patient's Name: Salbador Rodriguez YOB: 1955            _____________________________________________________________________________________________    Lia Bey to leave message to reschedule Initial [facility] PharmD  appointment for Specialty Medication Services. Please call: [phone] option 4. Will continue to outreach as appropriate. Thank you,  Kalli Alfredo.  Corazon Sahu, [address] [place] [address] Specialty Medication Service

## 2022-11-18 NOTE — TELEPHONE ENCOUNTER
Specialty Medication Service    Date: 11/18/2022  Patient's Name: Rosa Stewart YOB: 1955            _____________________________________________________________________________________________    Glorious Sasha to leave message to reschedule Initial [facility] PharmD  appointment for Specialty Medication Services. Please call: [phone] option 4. Patient has been attempted to schedule appointment 8 times previously and now 3 times to reschedule. Will shavonne patient as unable to reach for SMS tracking purposes. Override will be placed for remainder of PA on Taltz (01/14/2023). At that time will make another three attempts to reach patient. Will un-enroll from [facility] clinical calls today, but not disable patient.      Jae Davis, PharmD [facility]  Ambulatory Clinical Pharmacist  Specialty Medication Services  Phone: [phone]     For Pharmacy [address] [facility] [address] in place: No  Recommendation Provided To: Patient/Caregiver: 1 via Telephone  Intervention Detail: Scheduled Appointment  Intervention Accepted By: Patient/Caregiver: 0  Time Spent (min): 60

## 2022-11-23 ENCOUNTER — HOSPITAL ENCOUNTER (OUTPATIENT)
Dept: MRI IMAGING | Age: 67
Discharge: HOME | End: 2022-11-23
Attending: INTERNAL MEDICINE
Payer: COMMERCIAL

## 2022-11-23 DIAGNOSIS — C25.0 MALIGNANT NEOPLASM OF HEAD OF PANCREAS (HCC): ICD-10-CM

## 2022-11-23 LAB — CREAT BLD-MCNC: 1.1 MG/DL (ref 0.6–1.3)

## 2022-11-23 PROCEDURE — 82565 ASSAY OF CREATININE: CPT

## 2022-11-23 PROCEDURE — 74182 MRI ABDOMEN W/CONTRAST: CPT

## 2022-11-23 PROCEDURE — A9577 INJ MULTIHANCE: HCPCS | Performed by: INTERNAL MEDICINE

## 2022-11-23 PROCEDURE — 74011250636 HC RX REV CODE- 250/636: Performed by: INTERNAL MEDICINE

## 2022-11-23 RX ADMIN — GADOBENATE DIMEGLUMINE 11 ML: 529 INJECTION, SOLUTION INTRAVENOUS at 11:02

## 2023-01-06 ENCOUNTER — TRANSCRIBE ORDER (OUTPATIENT)
Dept: SCHEDULING | Age: 68
End: 2023-01-06

## 2023-01-06 DIAGNOSIS — C25.0 MALIGNANT NEOPLASM OF HEAD OF PANCREAS (HCC): Primary | ICD-10-CM

## 2023-01-12 ENCOUNTER — HOSPITAL ENCOUNTER (OUTPATIENT)
Dept: CT IMAGING | Age: 68
Discharge: HOME | End: 2023-01-12
Attending: INTERNAL MEDICINE
Payer: COMMERCIAL

## 2023-01-12 DIAGNOSIS — C25.0 MALIGNANT NEOPLASM OF HEAD OF PANCREAS (HCC): ICD-10-CM

## 2023-01-12 PROCEDURE — 74011000636 HC RX REV CODE- 636: Performed by: INTERNAL MEDICINE

## 2023-01-12 PROCEDURE — 74177 CT ABD & PELVIS W/CONTRAST: CPT

## 2023-01-12 RX ADMIN — IOPAMIDOL 100 ML: 755 INJECTION, SOLUTION INTRAVENOUS at 14:49

## 2023-01-18 ENCOUNTER — HOSPITAL ENCOUNTER (OUTPATIENT)
Dept: INTERVENTIONAL RADIOLOGY/VASCULAR | Age: 68
Discharge: HOME | End: 2023-01-18
Attending: INTERNAL MEDICINE

## 2023-01-18 RX ORDER — OXYCODONE HYDROCHLORIDE 5 MG/1
5 TABLET ORAL
COMMUNITY

## 2023-01-19 ENCOUNTER — HOSPITAL ENCOUNTER (OUTPATIENT)
Dept: INTERVENTIONAL RADIOLOGY/VASCULAR | Age: 68
End: 2023-01-19
Attending: INTERNAL MEDICINE
Payer: COMMERCIAL

## 2023-01-19 VITALS
HEART RATE: 99 BPM | DIASTOLIC BLOOD PRESSURE: 80 MMHG | RESPIRATION RATE: 16 BRPM | BODY MASS INDEX: 19.49 KG/M2 | WEIGHT: 110 LBS | HEIGHT: 63 IN | TEMPERATURE: 98.3 F | OXYGEN SATURATION: 99 % | SYSTOLIC BLOOD PRESSURE: 124 MMHG

## 2023-01-19 DIAGNOSIS — C25.0 ADENOCARCINOMA OF HEAD OF PANCREAS (HCC): ICD-10-CM

## 2023-01-19 DIAGNOSIS — C25.9 PANCREATIC CANCER (HCC): ICD-10-CM

## 2023-01-19 LAB
ANION GAP SERPL CALC-SCNC: 8 MMOL/L (ref 5–15)
BASOPHILS # BLD: 0 K/UL (ref 0–0.1)
BASOPHILS NFR BLD: 0 % (ref 0–1)
BUN SERPL-MCNC: 15 MG/DL (ref 6–20)
BUN/CREAT SERPL: 14 (ref 12–20)
CA-I BLD-MCNC: 8.5 MG/DL (ref 8.5–10.1)
CHLORIDE SERPL-SCNC: 105 MMOL/L (ref 97–108)
CO2 SERPL-SCNC: 24 MMOL/L (ref 21–32)
CREAT SERPL-MCNC: 1.1 MG/DL (ref 0.55–1.02)
DIFFERENTIAL METHOD BLD: ABNORMAL
EOSINOPHIL # BLD: 0 K/UL (ref 0–0.4)
EOSINOPHIL NFR BLD: 0 % (ref 0–7)
ERYTHROCYTE [DISTWIDTH] IN BLOOD BY AUTOMATED COUNT: 14.9 % (ref 11.5–14.5)
GLUCOSE SERPL-MCNC: 117 MG/DL (ref 65–100)
HCT VFR BLD AUTO: 28.5 % (ref 35–47)
HGB BLD-MCNC: 9.7 G/DL (ref 11.5–16)
IMM GRANULOCYTES # BLD AUTO: 0.1 K/UL (ref 0–0.04)
IMM GRANULOCYTES NFR BLD AUTO: 1 % (ref 0–0.5)
INR PPP: 1.4 (ref 0.9–1.1)
LYMPHOCYTES # BLD: 0.2 K/UL (ref 0.8–3.5)
LYMPHOCYTES NFR BLD: 2 % (ref 12–49)
MCH RBC QN AUTO: 30.8 PG (ref 26–34)
MCHC RBC AUTO-ENTMCNC: 34 G/DL (ref 30–36.5)
MCV RBC AUTO: 90.5 FL (ref 80–99)
MONOCYTES # BLD: 0.2 K/UL (ref 0–1)
MONOCYTES NFR BLD: 2 % (ref 5–13)
NEUTS SEG # BLD: 9 K/UL (ref 1.8–8)
NEUTS SEG NFR BLD: 95 % (ref 32–75)
NRBC # BLD: 0 K/UL (ref 0–0.01)
NRBC BLD-RTO: 0 PER 100 WBC
PLATELET # BLD AUTO: 208 K/UL (ref 150–400)
PMV BLD AUTO: 11.3 FL (ref 8.9–12.9)
POTASSIUM SERPL-SCNC: 3.6 MMOL/L (ref 3.5–5.1)
PROTHROMBIN TIME: 16.8 SEC (ref 11.9–14.6)
RBC # BLD AUTO: 3.15 M/UL (ref 3.8–5.2)
SODIUM SERPL-SCNC: 137 MMOL/L (ref 136–145)
WBC # BLD AUTO: 9.5 K/UL (ref 3.6–11)

## 2023-01-19 PROCEDURE — 85025 COMPLETE CBC W/AUTO DIFF WBC: CPT

## 2023-01-19 PROCEDURE — 74011250636 HC RX REV CODE- 250/636: Performed by: STUDENT IN AN ORGANIZED HEALTH CARE EDUCATION/TRAINING PROGRAM

## 2023-01-19 PROCEDURE — 36415 COLL VENOUS BLD VENIPUNCTURE: CPT

## 2023-01-19 PROCEDURE — 99152 MOD SED SAME PHYS/QHP 5/>YRS: CPT

## 2023-01-19 PROCEDURE — 99153 MOD SED SAME PHYS/QHP EA: CPT

## 2023-01-19 PROCEDURE — 74011250636 HC RX REV CODE- 250/636: Performed by: INTERNAL MEDICINE

## 2023-01-19 PROCEDURE — 77001 FLUOROGUIDE FOR VEIN DEVICE: CPT

## 2023-01-19 PROCEDURE — 80048 BASIC METABOLIC PNL TOTAL CA: CPT

## 2023-01-19 PROCEDURE — 85610 PROTHROMBIN TIME: CPT

## 2023-01-19 PROCEDURE — 36561 INSERT TUNNELED CV CATH: CPT

## 2023-01-19 PROCEDURE — 76937 US GUIDE VASCULAR ACCESS: CPT

## 2023-01-19 RX ORDER — MIDAZOLAM HYDROCHLORIDE 1 MG/ML
.5-2 INJECTION, SOLUTION INTRAMUSCULAR; INTRAVENOUS
Status: DISCONTINUED | OUTPATIENT
Start: 2023-01-19 | End: 2023-01-19

## 2023-01-19 RX ORDER — FENTANYL CITRATE 50 UG/ML
25-100 INJECTION, SOLUTION INTRAMUSCULAR; INTRAVENOUS
Status: DISCONTINUED | OUTPATIENT
Start: 2023-01-19 | End: 2023-01-19

## 2023-01-19 RX ORDER — SODIUM CHLORIDE 9 MG/ML
25 INJECTION, SOLUTION INTRAVENOUS CONTINUOUS
Status: DISCONTINUED | OUTPATIENT
Start: 2023-01-19 | End: 2023-01-28 | Stop reason: HOSPADM

## 2023-01-19 RX ORDER — CLINDAMYCIN PHOSPHATE 900 MG/50ML
900 INJECTION, SOLUTION INTRAVENOUS ONCE
Status: COMPLETED | OUTPATIENT
Start: 2023-01-19 | End: 2023-01-19

## 2023-01-19 RX ADMIN — FENTANYL CITRATE 50 MCG: 50 INJECTION, SOLUTION INTRAMUSCULAR; INTRAVENOUS at 13:02

## 2023-01-19 RX ADMIN — MIDAZOLAM 1 MG: 1 INJECTION INTRAMUSCULAR; INTRAVENOUS at 12:55

## 2023-01-19 RX ADMIN — CLINDAMYCIN PHOSPHATE 900 MG: 900 INJECTION, SOLUTION INTRAVENOUS at 12:40

## 2023-01-19 RX ADMIN — SODIUM CHLORIDE 25 ML/HR: 9 INJECTION, SOLUTION INTRAVENOUS at 10:43

## 2023-01-19 RX ADMIN — FENTANYL CITRATE 50 MCG: 50 INJECTION, SOLUTION INTRAMUSCULAR; INTRAVENOUS at 12:55

## 2023-01-19 RX ADMIN — MIDAZOLAM 1 MG: 1 INJECTION INTRAMUSCULAR; INTRAVENOUS at 13:02

## 2023-01-19 NOTE — H&P
INTERVENTIONAL RADIOLOGY  Preoperative History and Physical      Patient:  Giovanni Santos  :  1955  Age:  78 y.o. MRN:  [id number]  Today's Date:  2023      CC / HPI   Giovanni Santos is a 78 y.o. female with a history of pancreatic cancer who presents for mediport placement. PAST MEDICAL HISTORY  Past Medical History:   Diagnosis Date    Cancer [facility])     pancreatic    HTN (hypertension)     Joint pain     Rheumatism     Thromboembolus ([address]     2022 , in abdomen pt states    Thyroid disorder      PAST SURGICAL HISTORY  Past Surgical History:   Procedure Laterality Date    HX HEENT      cataract    HX HYSTERECTOMY       SOCIAL HISTORY  Social History     Socioeconomic History    Marital status: SINGLE     Spouse name: Not on file    Number of children: Not on file    Years of education: Not on file    Highest education level: Not on file   Occupational History    Not on file   Tobacco Use    Smoking status: Never    Smokeless tobacco: Never   Substance and Sexual Activity    Alcohol use: No    Drug use: No    Sexual activity: Never     Birth control/protection: Surgical   Other Topics Concern    Not on file   Social History Narrative    Not on file     Social Determinants of Health     Financial Resource Strain: Not on file   Food Insecurity: Not on file   Transportation Needs: Not on file   Physical Activity: Not on file   Stress: Not on file   Social Connections: Not on file   Intimate Partner Violence: Not on file   Housing Stability: Not on file     FAMILY HISTORY  Family History   Problem Relation Age of Onset    Hypertension Mother      CURRENT MEDICATIONS  Current Outpatient Medications   Medication Sig Dispense Refill    oxyCODONE IR (ROXICODONE) 5 mg immediate release tablet Take 5 mg by mouth every eight (8) hours as needed for Pain. dicyclomine (BENTYL) 10 mg capsule Take 10 mg by mouth two (2) times daily as needed.       acetaminophen (TYLENOL) 325 mg tablet Take 2 Tablets by mouth every six (6) hours as needed for Pain or Fever. 60 Tablet 0    apixaban (Eliquis) 5 mg tablet Take 1 Tablet by mouth two (2) times a day. 60 Tablet 0    amLODIPine-benazepril (LOTREL) 10-40 mg per capsule       levothyroxine (SYNTHROID) 112 mcg tablet       folic acid (FOLVITE) 1 mg tablet folic acid 1 mg tablet      ergocalciferol (DRISDOL) 50,000 unit capsule Take 1 Cap by mouth every seven (7) days. 8 Cap 3    levoFLOXacin (Levaquin) 500 mg tablet Take 1 Tablet by mouth daily.  (Patient not taking: Reported on 1/18/2023) 7 Tablet 0    clobetasoL (TEMOVATE) 0.05 % topical cream       Taltz Autoinjector 80 mg/mL auto injector  (Patient not taking: Reported on 1/18/2023)       Current Facility-Administered Medications   Medication Dose Route Frequency Provider Last Rate Last Admin    0.9% sodium chloride infusion  25 mL/hr IntraVENous CONTINUOUS Jass Barroso MD 25 mL/hr at 01/19/23 1043 25 mL/hr at 01/19/23 1043     ALLERGIES  Allergies   Allergen Reactions    Pcn [Penicillins] Itching       DIAGNOSTIC STUDIES   IMAGING STUDIES  N/A    LABS  Lab Results   Component Value Date/Time    WBC 9.5 01/19/2023 10:30 AM    HGB 9.7 (L) 01/19/2023 10:30 AM    HCT 28.5 (L) 01/19/2023 10:30 AM    PLATELET 618 10/95/0095 10:30 AM    MCV 90.5 01/19/2023 10:30 AM     Lab Results   Component Value Date/Time    Sodium 137 01/19/2023 10:30 AM    Potassium 3.6 01/19/2023 10:30 AM    Chloride 105 01/19/2023 10:30 AM    CO2 24 01/19/2023 10:30 AM    Anion gap 8 01/19/2023 10:30 AM    Glucose 117 (H) 01/19/2023 10:30 AM    BUN 15 01/19/2023 10:30 AM    Creatinine 1.10 (H) 01/19/2023 10:30 AM    BUN/Creatinine ratio 14 01/19/2023 10:30 AM    GFR est AA >60 04/22/2022 06:23 AM    GFR est non-AA >60 04/22/2022 06:23 AM    Calcium 8.5 01/19/2023 10:30 AM     Lab Results   Component Value Date/Time    INR 1.4 (H) 01/19/2023 10:30 AM    Prothrombin time 16.8 (H) 01/19/2023 10:30 AM       PHYSICAL EXAM   BP (!) 99/59   Pulse (!) 101   Resp 12   Ht 5' 3\" (1.6 m)   Wt 49.9 kg (110 lb)   SpO2 100%   BMI 19.49 kg/m²   General:  NAD  Heart:  RRR  Lungs:  NWOB  Neurological:  AAOX3      PLAN   Procedure to be performed:  US and fluoro guided mediport placement  Plan for sedation:  moderate  Post procedure plan:  observation per protocol  Informed consent:  risks, benefits, and alternatives reviewed with the patient / family who agree to proceed  Code status:  full code      Dalila Blas., KESHIA  [facility], Eufemia Ramos.

## 2023-02-20 ENCOUNTER — HOSPITAL ENCOUNTER (EMERGENCY)
Age: 68
Discharge: HOME | End: 2023-02-20
Attending: FAMILY MEDICINE
Payer: COMMERCIAL

## 2023-02-20 ENCOUNTER — APPOINTMENT (OUTPATIENT)
Dept: GENERAL RADIOLOGY | Age: 68
End: 2023-02-20
Attending: FAMILY MEDICINE
Payer: COMMERCIAL

## 2023-02-20 VITALS
TEMPERATURE: 97.2 F | SYSTOLIC BLOOD PRESSURE: 128 MMHG | WEIGHT: 116 LBS | HEART RATE: 77 BPM | BODY MASS INDEX: 21.35 KG/M2 | OXYGEN SATURATION: 97 % | DIASTOLIC BLOOD PRESSURE: 75 MMHG | RESPIRATION RATE: 18 BRPM | HEIGHT: 62 IN

## 2023-02-20 DIAGNOSIS — R91.8 PULMONARY NODULES: ICD-10-CM

## 2023-02-20 DIAGNOSIS — R05.1 ACUTE COUGH: Primary | ICD-10-CM

## 2023-02-20 LAB — SARS-COV-2, COV2: NORMAL

## 2023-02-20 PROCEDURE — 71046 X-RAY EXAM CHEST 2 VIEWS: CPT

## 2023-02-20 PROCEDURE — 99284 EMERGENCY DEPT VISIT MOD MDM: CPT

## 2023-02-20 PROCEDURE — U0005 INFEC AGEN DETEC AMPLI PROBE: HCPCS

## 2023-02-20 RX ORDER — BENZONATATE 100 MG/1
100 CAPSULE ORAL
Qty: 10 CAPSULE | Refills: 0 | Status: SHIPPED | OUTPATIENT
Start: 2023-02-20 | End: 2023-02-25

## 2023-02-20 RX ORDER — ONDANSETRON 4 MG/1
TABLET, ORALLY DISINTEGRATING ORAL
COMMUNITY
Start: 2023-01-10

## 2023-02-20 NOTE — ED PROVIDER NOTES
EMERGENCY DEPARTMENT HISTORY AND PHYSICAL EXAM      Date: 2/20/2023  Patient Name: Ana Finnegan    History of Presenting Illness     Chief Complaint   Patient presents with    Cough       History Provided By: Patient    HPI: Ana Finnegan, 78 y.o. female presents to the ED with CC of cough x2 weeks. Patient has history of pancreatic cancer and is on chemotherapy. Cough is nonproductive. No increased work of breathing. Also feeling congested. No fevers chills rigors. Patient denies alleviating nor aggravating factors. Patient denies SOB, chest pain, or any neurological symptoms. Patient denies any other symptoms nor concerns at this time  Past History     Past Medical History:  Past Medical History:   Diagnosis Date    Cancer [facility])     pancreatic    HTN (hypertension)     Joint pain     Rheumatism     Thromboembolus ([address]     april 2022 , in abdomen pt states    Thyroid disorder        Allergies: Allergies   Allergen Reactions    Pcn [Penicillins] Itching       Review of Systems   Vital signs and nursing notes reviewed  Review of Systems   Constitutional:  Negative for activity change, appetite change, chills, fatigue and fever. HENT:  Negative for congestion and sore throat. Eyes:  Negative for photophobia and visual disturbance. Respiratory:  Positive for cough. Negative for shortness of breath and wheezing. Cardiovascular:  Negative for chest pain, palpitations and leg swelling. Gastrointestinal:  Negative for abdominal pain, diarrhea, nausea and vomiting. Endocrine: Negative for cold intolerance and heat intolerance. Musculoskeletal:  Negative for gait problem and joint swelling. Skin:  Negative for color change and rash. Neurological:  Negative for dizziness and headaches.        Physical Exam   Visit Vitals  /75 (BP 1 Location: Right upper arm, BP Patient Position: At rest)   Pulse 77   Temp 97.2 °F (36.2 °C)   Resp 18   Ht 5' 2\" (1.575 m)   Wt 52.6 kg (116 lb) SpO2 97%   BMI 21.22 kg/m²     CONSTITUTIONAL: Alert, in no distress. Appears stated age.,  Nontoxic  HEAD:  Normocephalic, atraumatic, uvula midline, no muffled voice, no findings of peritonsillar abscess, tolerating secretions with ease  EYES: EOM intact. No conjunctival injection or scleral icterus  Neck:  Supple. No meningismus,  RESP: No increased work of breathing, lungs clear to auscultation bilaterally. No wheezes rales nor rhonchi  CV: Heart is regular rate and rhythm, no murmurs, no JVD, capillary refill less than 2 seconds  NEURO: Moves all extremities spontaneously. No motor nor sensory deficits. No focal neurologic deficits. PSYCH: Normal mood, normal affect      ED course/medical decision making       Patient presented to the emergency department with the aforementioned chief complaint. Differential diagnosis includes viral URI, bronchitis, rhinitis, postnasal drip, self-limiting viral illness    On examination the patient is nontoxic and overall well-appearing. No hypoxia. Well-hydrated on exam.  Lungs are clear to auscultation bilaterally making pneumonia less likely. No increased work of breathing. COVID-19 testing was not conducted. Patient was given quarantine/isolation recommendations and agrees with the plan to be discharged home. They were provided instructions to return to the emergency department with any difficulty breathing, chest pain, altered mentation or any other new or worsening symptoms. History and exam findings consistent with likely self-limiting viral illness. Patient was discharged home in stable and ambulatory condition. Critical Care Time: None    Disposition:  DISCHARGE NOTE:  The pt is ready for discharge. The pt's signs, symptoms, diagnosis, and discharge instructions have been discussed and pt has conveyed their understanding. The pt is to follow up as recommended or return to ER should their symptoms worsen.  Plan has been discussed and pt is in agreement. PLAN:  1. Discharge Medication List as of 2/20/2023 10:57 AM        START taking these medications    Details   benzonatate (Tessalon Perles) 100 mg capsule Take 1 Capsule by mouth three (3) times daily as needed for Cough for up to 5 days. , Normal, Disp-10 Capsule, R-0           CONTINUE these medications which have NOT CHANGED    Details   ondansetron (ZOFRAN ODT) 4 mg disintegrating tablet DISSOLVE 1 TABLET ON THE TONGUE EVERY 6 TO 8 HOURS AS NEEDED FOR NAUSEA, Historical Med      oxyCODONE IR (ROXICODONE) 5 mg immediate release tablet Take 5 mg by mouth every eight (8) hours as needed for Pain., Historical Med      dicyclomine (BENTYL) 10 mg capsule Take 10 mg by mouth two (2) times daily as needed., Historical Med      acetaminophen (TYLENOL) 325 mg tablet Take 2 Tablets by mouth every six (6) hours as needed for Pain or Fever., Normal, Disp-60 Tablet, R-0      levoFLOXacin (Levaquin) 500 mg tablet Take 1 Tablet by mouth daily. , Normal, Disp-7 Tablet, R-0      apixaban (Eliquis) 5 mg tablet Take 1 Tablet by mouth two (2) times a day., Normal, Disp-60 Tablet, R-0      amLODIPine-benazepril (LOTREL) 10-40 mg per capsule Historical Med      clobetasoL (TEMOVATE) 0.05 % topical cream Historical Med      levothyroxine (SYNTHROID) 112 mcg tablet Historical Med      folic acid (FOLVITE) 1 mg tablet folic acid 1 mg tablet, Historical Med      Taltz Autoinjector 80 mg/mL auto injector Historical [facility]      ergocalciferol (DRISDOL) 50,000 unit capsule Take 1 Cap by mouth every seven (7) days. Normal, 50,000 Units, Disp-8 Cap, R-3           2. Follow-up Information       Follow up With Specialties Details Why Contact Info    Your primary care doctor  Schedule an appointment as soon as possible for a visit in 1 day      Please call your oncologist today to make an appointment              3. Take Tylenol or Ibuprofen as needed  4. Drink plenty of fluids  5.  Return to ED if worse especially if any shortness of breath, chest pain or altered mentation. Diagnosis     Clinical Impression:   1. Acute cough    2. Pulmonary nodules            Please note that this dictation was completed with [employer], the computer voice recognition software. Quite often unanticipated grammatical, syntax, homophones, and other interpretive errors are inadvertently transcribed by the computer software. Please disregards these errors. Please excuse any errors that have escaped final proofreading.

## 2023-02-20 NOTE — DISCHARGE INSTRUCTIONS
Thank you! Thank you for allowing me to care for you in the emergency department. It is my goal to provide you with excellent care. If you have not received excellent quality care, please ask to speak to the nurse manager. Please fill out the survey that will come to you by mail or email since we listen to your feedback! Below you will find a list of your tests from today's visit. Should you have any questions, please do not hesitate to call the emergency department. Labs  No results found for this or any previous visit (from the past 12 hour(s)). Radiologic Studies  XR CHEST PA LAT   Final Result   Clear lungs. CT Results  (Last 48 hours)      None          CXR Results  (Last 48 hours)                 02/20/23 1007  XR CHEST PA LAT Final result    Impression:  Clear lungs. Narrative:  PA AND LATERAL CHEST RADIOGRAPHS: 2/20/2023 10:07 AM       INDICATION: Cough for 2 weeks, pulmonary metastases. COMPARISON: 3/13/2014; CT chest 1/12/2023. TECHNIQUE: Frontal and lateral radiographs of the chest.       FINDINGS:   The lungs are radiographically clear The central airways are patent. The heart   size is normal. No pneumothorax or pleural effusion. A right IJ ported catheter   terminates in the cavoatrial junction.                 ------------------------------------------------------------------------------------------------------------  The exam and treatment you received in the Emergency Department were for an urgent problem and are not intended as complete care. It is important that you follow-up with a doctor, nurse practitioner, or physician assistant to:  (1) confirm your diagnosis,  (2) re-evaluation of changes in your illness and treatment, and  (3) for ongoing care. Please take your discharge instructions with you when you go to your follow-up appointment. If you have any problem arranging a follow-up appointment, contact the Emergency Department.   If your symptoms become worse or you do not improve as expected and you are unable to reach your health care provider, please return to the Emergency Department. We are available 24 hours a day. If a prescription has been provided, please have it filled as soon as possible to prevent a delay in treatment. If you have any questions or reservations about taking the medication due to side effects or interactions with other medications, please call your primary care provider or contact the ER.

## 2023-02-20 NOTE — Clinical Note
[facility]  [address] [place] [id number]  [phone]    Work/School Note    Date: 2/20/2023    To Whom It May concern:    Abraham Gipson was seen and treated today in the emergency room by the following provider(s):  Attending Provider: Veena Palomino DO. Abraham Gipson is excused from work/school on 02/20/23 and 02/21/23. She is medically clear to return to work/school on 2/22/2023.        Sincerely,          Tavares Prado DO

## 2023-02-20 NOTE — ED TRIAGE NOTES
Pt presents with coughing x2 weeks but cough got worse yesterday. States she feels like she has bronchitis. Pt on chemo and concerned with illness.

## 2023-02-21 ENCOUNTER — PATIENT OUTREACH (OUTPATIENT)
Dept: OTHER | Age: 68
End: 2023-02-21

## 2023-02-21 LAB
SARS-COV-2 RNA RESP QL NAA+PROBE: DETECTED
SOURCE, COVRS: ABNORMAL

## 2023-02-21 NOTE — PROGRESS NOTES
Initial HPRP:   Patient on report as discharged from [facility] ED Visit 2/20/23 for Acute Cough. Initial attempt to contact patient for transitions of care. Left discreet message on voicemail with this Care Coordinator's contact information. Next Outreach 2/22/23. f/u - PCP, Oncology Appointment      benzonatate (Tessalon Perles) 100 mg capsule    Take Tylenol or Ibuprofen as needed   Drink plenty of fluids    Call 911 anytime you think you may need emergency care. For example, call if:   You have severe trouble breathing. Call your doctor now or seek immediate medical care if:  You cough up blood. You have new or worse trouble breathing. You have a new or higher fever. You have a new rash. You cough more deeply or more often, especially if you notice more mucus or a change in  the color of your mucus. You have new symptoms, such as a sore throat, an earache, or sinus pain. You do not get better as expected.

## 2023-02-22 ENCOUNTER — PATIENT OUTREACH (OUTPATIENT)
Dept: OTHER | Age: 68
End: 2023-02-22

## 2023-02-22 NOTE — PROGRESS NOTES
Care Transitions Initial Call    Call within 2 business days of discharge: Yes     Patient: Zoltan Pace Patient : 1955 MRN: [id number]    Last Discharge  Street       Date Complaint Diagnosis Description Type Department Provider    23 Cough Acute cough . .. ED (DISCHARGE) JAN Masters DO          Plan for follow-up call in 1-2 days based on severity of symptoms and risk factors.   Plan for next call: symptom management-Diagnosed with COVID on

## 2023-02-24 ENCOUNTER — PATIENT OUTREACH (OUTPATIENT)
Dept: OTHER | Age: 68
End: 2023-02-24

## 2023-02-24 NOTE — PROGRESS NOTES
Care Transitions Initial Call     Call within 2 business days of discharge: Yes      Patient: Ace Espinosa     Patient : 1955 MRN: [id number]     Last Discharge  Street         Date Complaint Diagnosis Description Type Department Provider     23 Cough Acute cough . .. ED (DISCHARGE) JAN Vallejo DO             Plan for follow-up call in 7-10 days based on severity of symptoms and risk factors.   Plan for next call: symptom management-Diagnosed with COVID on 23

## 2023-03-03 ENCOUNTER — PATIENT OUTREACH (OUTPATIENT)
Dept: OTHER | Age: 68
End: 2023-03-03

## 2023-03-22 ENCOUNTER — HOSPITAL ENCOUNTER (OUTPATIENT)
Dept: INFUSION THERAPY | Age: 68
Discharge: HOME | End: 2023-03-22
Payer: COMMERCIAL

## 2023-03-22 ENCOUNTER — PATIENT OUTREACH (OUTPATIENT)
Dept: OTHER | Age: 68
End: 2023-03-22

## 2023-03-22 VITALS
OXYGEN SATURATION: 100 % | RESPIRATION RATE: 14 BRPM | HEIGHT: 62 IN | TEMPERATURE: 98.2 F | BODY MASS INDEX: 20.06 KG/M2 | WEIGHT: 109 LBS | DIASTOLIC BLOOD PRESSURE: 68 MMHG | HEART RATE: 76 BPM | SYSTOLIC BLOOD PRESSURE: 112 MMHG

## 2023-03-22 PROCEDURE — 74011250636 HC RX REV CODE- 250/636: Performed by: INTERNAL MEDICINE

## 2023-03-22 PROCEDURE — 86900 BLOOD TYPING SEROLOGIC ABO: CPT

## 2023-03-22 PROCEDURE — 36430 TRANSFUSION BLD/BLD COMPNT: CPT

## 2023-03-22 PROCEDURE — 36415 COLL VENOUS BLD VENIPUNCTURE: CPT

## 2023-03-22 PROCEDURE — 86923 COMPATIBILITY TEST ELECTRIC: CPT

## 2023-03-22 PROCEDURE — P9016 RBC LEUKOCYTES REDUCED: HCPCS

## 2023-03-22 RX ORDER — HEPARIN 100 UNIT/ML
500 SYRINGE INTRAVENOUS AS NEEDED
Status: DISCONTINUED | OUTPATIENT
Start: 2023-03-22 | End: 2023-03-23 | Stop reason: HOSPADM

## 2023-03-22 RX ORDER — SODIUM CHLORIDE 9 MG/ML
25 INJECTION, SOLUTION INTRAVENOUS AS NEEDED
Status: DISCONTINUED | OUTPATIENT
Start: 2023-03-22 | End: 2023-03-24 | Stop reason: HOSPADM

## 2023-03-22 RX ADMIN — Medication 500 UNITS: at 14:37

## 2023-03-22 NOTE — ROUTINE PROCESS
Pt handed off from [address] [place] [address] at 1230. Transfusion was in progress. Pt did not exhibit any signs of transfusion reaction. Increased rate to 150. Transfusion completed. Post vitals stable. Pt agreed to be observed for 1 hour and no transfusion reaction noted. Reviewed AVS with pt. Pt able to verbalize when to call MD and/or 911. Pt's port was flushed with heparin and de-accessed. Pt discharged safely.

## 2023-03-22 NOTE — PROGRESS NOTES
Remained with patient during 1st 15 minutes of transfusion, VS stable, no signs/symptoms of reaction. Patient tolerating transfusion well.

## 2023-03-22 NOTE — PROGRESS NOTES
Patient ambulated self into SDS, VS obtained, port a cath accessed for labs drawn & sent. Patient resting comfortably.

## 2023-03-23 LAB
ABO + RH BLD: NORMAL
BLD PROD TYP BPU: NORMAL
BLOOD GROUP ANTIBODIES SERPL: NEGATIVE
BPU ID: NORMAL
CROSSMATCH RESULT,%XM: NORMAL
SPECIMEN EXP DATE BLD: NORMAL
STATUS OF UNIT,%ST: NORMAL
TRANSFUSION STATUS PATIENT QL: NORMAL
UNIT DIVISION, %UDIV: 0

## 2023-04-04 ENCOUNTER — TRANSCRIBE ORDER (OUTPATIENT)
Dept: SCHEDULING | Age: 68
End: 2023-04-04

## 2023-04-13 ENCOUNTER — HOSPITAL ENCOUNTER (OUTPATIENT)
Dept: CT IMAGING | Age: 68
Discharge: HOME | End: 2023-04-13
Attending: INTERNAL MEDICINE
Payer: COMMERCIAL

## 2023-04-13 DIAGNOSIS — C25.0 MALIGNANT NEOPLASM OF HEAD OF PANCREAS (HCC): ICD-10-CM

## 2023-04-13 LAB
BUN SERPL-MCNC: 19 MG/DL (ref 6–20)
CREAT SERPL-MCNC: 1.25 MG/DL (ref 0.55–1.02)

## 2023-04-13 PROCEDURE — 74011000636 HC RX REV CODE- 636: Performed by: INTERNAL MEDICINE

## 2023-04-13 PROCEDURE — 74177 CT ABD & PELVIS W/CONTRAST: CPT

## 2023-04-13 PROCEDURE — 82565 ASSAY OF CREATININE: CPT

## 2023-04-13 PROCEDURE — 84520 ASSAY OF UREA NITROGEN: CPT

## 2023-04-13 PROCEDURE — 36415 COLL VENOUS BLD VENIPUNCTURE: CPT

## 2023-04-13 RX ADMIN — IOPAMIDOL 100 ML: 755 INJECTION, SOLUTION INTRAVENOUS at 14:37

## 2023-04-18 ENCOUNTER — TELEPHONE (OUTPATIENT)
Dept: PHARMACY | Age: 68
End: 2023-04-18

## 2023-04-18 NOTE — TELEPHONE ENCOUNTER
Specialty Medication Service    Date: 4/18/2023  Patient's Name: Rich Najjar YOB: 1955            _____________________________________________________________________________________________    Patient has not filled Travis Heck since December and has not responded to specialty pharmacy phone calls in past three months nor does she have an active prescription on file until has an office visit with provider. Will discharge patient from program at this time and re-attempt engagement in the future if they start taking Travis Heck again or different specialty medication.      Aristeo Alvarado, PharmD [facility]  Ambulatory Clinical Pharmacist  Specialty Medication Services  Phone: [phone] option #4  Fax: [phone]    For Pharmacy Admin Tracking Only    Program: SMS  CPA in place: No  Time Spent (min): 15

## 2023-05-03 ENCOUNTER — HOSPITAL ENCOUNTER (OUTPATIENT)
Dept: INFUSION THERAPY | Age: 68
Discharge: HOME | End: 2023-05-03
Payer: COMMERCIAL

## 2023-05-03 VITALS
RESPIRATION RATE: 16 BRPM | SYSTOLIC BLOOD PRESSURE: 127 MMHG | TEMPERATURE: 98.4 F | OXYGEN SATURATION: 100 % | HEART RATE: 65 BPM | DIASTOLIC BLOOD PRESSURE: 73 MMHG

## 2023-05-03 PROCEDURE — 36415 COLL VENOUS BLD VENIPUNCTURE: CPT

## 2023-05-03 PROCEDURE — 36430 TRANSFUSION BLD/BLD COMPNT: CPT

## 2023-05-03 PROCEDURE — 74011250636 HC RX REV CODE- 250/636: Performed by: INTERNAL MEDICINE

## 2023-05-03 PROCEDURE — 86900 BLOOD TYPING SEROLOGIC ABO: CPT

## 2023-05-03 PROCEDURE — P9016 RBC LEUKOCYTES REDUCED: HCPCS

## 2023-05-03 PROCEDURE — 86923 COMPATIBILITY TEST ELECTRIC: CPT

## 2023-05-03 RX ORDER — HEPARIN 100 UNIT/ML
500 SYRINGE INTRAVENOUS AS NEEDED
Status: DISCONTINUED | OUTPATIENT
Start: 2023-05-03 | End: 2023-05-05 | Stop reason: HOSPADM

## 2023-05-03 RX ORDER — SODIUM CHLORIDE 9 MG/ML
250 INJECTION, SOLUTION INTRAVENOUS AS NEEDED
Status: DISCONTINUED | OUTPATIENT
Start: 2023-05-03 | End: 2023-05-05 | Stop reason: HOSPADM

## 2023-05-03 RX ADMIN — Medication 500 UNITS: at 17:09

## 2023-05-04 LAB
ABO + RH BLD: NORMAL
BLD PROD TYP BPU: NORMAL
BLD PROD TYP BPU: NORMAL
BLOOD GROUP ANTIBODIES SERPL: NEGATIVE
BPU ID: NORMAL
BPU ID: NORMAL
CROSSMATCH RESULT,%XM: NORMAL
CROSSMATCH RESULT,%XM: NORMAL
SPECIMEN EXP DATE BLD: NORMAL
STATUS OF UNIT,%ST: NORMAL
STATUS OF UNIT,%ST: NORMAL
TRANSFUSION STATUS PATIENT QL: NORMAL
TRANSFUSION STATUS PATIENT QL: NORMAL
UNIT DIVISION, %UDIV: 0
UNIT DIVISION, %UDIV: 0

## 2023-06-22 ENCOUNTER — HOSPITAL ENCOUNTER (EMERGENCY)
Facility: HOSPITAL | Age: 68
Discharge: HOME | End: 2023-06-23
Attending: EMERGENCY MEDICINE
Payer: COMMERCIAL

## 2023-06-22 ENCOUNTER — APPOINTMENT (OUTPATIENT)
Facility: HOSPITAL | Age: 68
End: 2023-06-22
Payer: COMMERCIAL

## 2023-06-22 DIAGNOSIS — K59.03 DRUG-INDUCED CONSTIPATION: Primary | ICD-10-CM

## 2023-06-22 PROCEDURE — 99283 EMERGENCY DEPT VISIT LOW MDM: CPT

## 2023-06-22 PROCEDURE — 74018 RADEX ABDOMEN 1 VIEW: CPT

## 2023-06-22 ASSESSMENT — PAIN SCALES - GENERAL: PAINLEVEL_OUTOF10: 8

## 2023-06-22 ASSESSMENT — PAIN - FUNCTIONAL ASSESSMENT: PAIN_FUNCTIONAL_ASSESSMENT: 0-10

## 2023-06-23 VITALS
DIASTOLIC BLOOD PRESSURE: 66 MMHG | SYSTOLIC BLOOD PRESSURE: 111 MMHG | HEART RATE: 88 BPM | WEIGHT: 102 LBS | OXYGEN SATURATION: 99 % | RESPIRATION RATE: 18 BRPM | TEMPERATURE: 98.3 F | HEIGHT: 61 IN | BODY MASS INDEX: 19.26 KG/M2

## 2023-06-23 RX ORDER — DOCUSATE SODIUM 100 MG/1
100 CAPSULE, LIQUID FILLED ORAL 2 TIMES DAILY
Qty: 30 CAPSULE | Refills: 0 | Status: SHIPPED | OUTPATIENT
Start: 2023-06-23 | End: 2023-07-08

## 2023-06-23 RX ORDER — POLYETHYLENE GLYCOL 3350 17 G/17G
17 POWDER ORAL DAILY
Qty: 116 G | Refills: 1 | Status: SHIPPED | OUTPATIENT
Start: 2023-06-23 | End: 2023-07-07

## 2023-06-23 RX ORDER — MAGNESIUM CITRATE
150 SOLUTION, ORAL ORAL ONCE
Qty: 1 ML | Refills: 0 | Status: SHIPPED | OUTPATIENT
Start: 2023-06-23 | End: 2023-06-23

## 2023-06-23 ASSESSMENT — PAIN - FUNCTIONAL ASSESSMENT: PAIN_FUNCTIONAL_ASSESSMENT: 0-10

## 2023-06-23 ASSESSMENT — PAIN SCALES - GENERAL: PAINLEVEL_OUTOF10: 6

## 2023-06-23 NOTE — ED PROVIDER NOTES
EMERGENCY DEPARTMENT HISTORY AND PHYSICAL EXAM    Date: 6/22/2023  Patient Name: Amira Boyle    History of Presenting Illness     Chief Complaint   Patient presents with    Abdominal Pain    Constipation       History Provided By: Patient    HPI: Amira Boyle, 78 y.o. female   presents to the ED with cc of patient. Patient who is undergoing chemotherapy and on pain medications for pancreatic CA presents emergency room complaining constipation. Patient has small amount of hard stool today. Patient has had constipation for last several days. Patient took a dose of Dulcolax today without improvement. Patient complains of mild generalized bloating sensation. No vomiting. No signs of GI bleeding. No dysuria hematuria. No fever or chills. PCP: Kattie Nissen, MD    No current facility-administered medications on file prior to encounter.      Current Outpatient Medications on File Prior to Encounter   Medication Sig Dispense Refill    acetaminophen (TYLENOL) 325 MG tablet Take 2 tablets by mouth every 6 hours as needed      amLODIPine-benazepril (LOTREL) 10-40 MG per capsule ceived the following from [place] [address] - [facility]: Outside name: amLODIPine-benazepril (LOTREL) 10-40 mg per capsule      apixaban (ELIQUIS) 5 MG TABS tablet Take 1 tablet by mouth 2 times daily      clobetasol (TEMOVATE) 0.05 % cream ceived the following from [facility]: Outside name: clobetasoL (TEMOVATE) 0.05 % topical cream      dicyclomine (BENTYL) 10 MG capsule Take 1 capsule by mouth 2 times daily as needed      ergocalciferol (ERGOCALCIFEROL) 1.25 MG (03813 UT) capsule Take 1 capsule by mouth every 7 days      folic acid (FOLVITE) 1 MG tablet ceived the following from [facility]: Outside name: folic acid (FOLVITE) 1 mg tablet      ixekizumab (TALTZ) 80 MG/ML SOAJ prefilled syringe ceived the following from [facility]: Outside name: Taltz Autoinjector 80 mg/mL auto

## 2023-06-23 NOTE — ED TRIAGE NOTES
States bm have been difficult to pas. States her last one was 2 days ago. Abd is distended. Denies n/v. Last chemo was last Tuesday.

## 2023-06-26 ENCOUNTER — CARE COORDINATION (OUTPATIENT)
Dept: OTHER | Facility: CLINIC | Age: 68
End: 2023-06-26

## 2023-07-05 ENCOUNTER — HOSPITAL ENCOUNTER (OUTPATIENT)
Facility: HOSPITAL | Age: 68
Setting detail: INFUSION SERIES
End: 2023-07-05
Payer: COMMERCIAL

## 2023-07-05 VITALS
HEART RATE: 74 BPM | DIASTOLIC BLOOD PRESSURE: 79 MMHG | BODY MASS INDEX: 18.84 KG/M2 | WEIGHT: 102.4 LBS | RESPIRATION RATE: 16 BRPM | TEMPERATURE: 97.6 F | OXYGEN SATURATION: 100 % | SYSTOLIC BLOOD PRESSURE: 130 MMHG | HEIGHT: 62 IN

## 2023-07-05 LAB
HCT VFR BLD AUTO: 20.3 % (ref 35–47)
HGB BLD-MCNC: 6.9 G/DL (ref 11.5–16)

## 2023-07-05 PROCEDURE — 85018 HEMOGLOBIN: CPT

## 2023-07-05 PROCEDURE — 36415 COLL VENOUS BLD VENIPUNCTURE: CPT

## 2023-07-05 PROCEDURE — 85014 HEMATOCRIT: CPT

## 2023-07-05 PROCEDURE — 6360000004 HC RX CONTRAST MEDICATION: Performed by: INTERNAL MEDICINE

## 2023-07-05 PROCEDURE — P9016 RBC LEUKOCYTES REDUCED: HCPCS

## 2023-07-05 PROCEDURE — 86923 COMPATIBILITY TEST ELECTRIC: CPT

## 2023-07-05 PROCEDURE — 86900 BLOOD TYPING SEROLOGIC ABO: CPT

## 2023-07-05 PROCEDURE — 6360000002 HC RX W HCPCS: Performed by: INTERNAL MEDICINE

## 2023-07-05 PROCEDURE — 36430 TRANSFUSION BLD/BLD COMPNT: CPT

## 2023-07-05 PROCEDURE — 86901 BLOOD TYPING SEROLOGIC RH(D): CPT

## 2023-07-05 PROCEDURE — 86850 RBC ANTIBODY SCREEN: CPT

## 2023-07-05 RX ORDER — SODIUM CHLORIDE 9 MG/ML
INJECTION, SOLUTION INTRAVENOUS PRN
Status: DISCONTINUED | OUTPATIENT
Start: 2023-07-05 | End: 2023-07-05

## 2023-07-05 RX ORDER — SODIUM CHLORIDE 9 MG/ML
INJECTION, SOLUTION INTRAVENOUS PRN
Status: DISCONTINUED | OUTPATIENT
Start: 2023-07-05 | End: 2023-10-28 | Stop reason: HOSPADM

## 2023-07-05 RX ORDER — HEPARIN 100 UNIT/ML
500 SYRINGE INTRAVENOUS ONCE
Status: COMPLETED | OUTPATIENT
Start: 2023-07-05 | End: 2023-07-05

## 2023-07-05 RX ADMIN — HEPARIN 500 UNITS: 100 SYRINGE at 16:43

## 2023-07-05 ASSESSMENT — PAIN SCALES - GENERAL: PAINLEVEL_OUTOF10: 0

## 2023-07-05 NOTE — PROGRESS NOTES
Patient alert and oriented x 4 with respirations even and unlabored on RA with no signs of distress noted. Bedside face to face report given to NYLA Dennis RN for continuation of care.

## 2023-07-05 NOTE — PROGRESS NOTES
Notified Dr. Armin Lovell of patient's serum hemoglobin today of 6.9, last serum hemoglobin noted on 7-3-23 of 7.8 and [facility] to [facility] one unit PRBC per MD order. New order received for patient to receive two units of PRBC today at [facility]. Will continue to monitor.

## 2023-07-06 LAB
ABO + RH BLD: NORMAL
BLD PROD TYP BPU: NORMAL
BLD PROD TYP BPU: NORMAL
BLOOD BANK DISPENSE STATUS: NORMAL
BLOOD BANK DISPENSE STATUS: NORMAL
BLOOD GROUP ANTIBODIES SERPL: NEGATIVE
BPU ID: NORMAL
BPU ID: NORMAL
CROSSMATCH RESULT: NORMAL
CROSSMATCH RESULT: NORMAL
SPECIMEN EXP DATE BLD: NORMAL
TRANSFUSION STATUS PATIENT QL: NORMAL
TRANSFUSION STATUS PATIENT QL: NORMAL
UNIT DIVISION: 0
UNIT DIVISION: 0

## 2023-07-07 ENCOUNTER — HOSPITAL ENCOUNTER (OUTPATIENT)
Facility: HOSPITAL | Age: 68
End: 2023-07-07
Attending: INTERNAL MEDICINE
Payer: COMMERCIAL

## 2023-07-07 DIAGNOSIS — C25.9 MALIGNANT NEOPLASM OF PANCREAS, UNSPECIFIED LOCATION OF MALIGNANCY (HCC): ICD-10-CM

## 2023-07-07 LAB
BUN SERPL-MCNC: 24 MG/DL (ref 6–20)
CREAT SERPL-MCNC: 1.23 MG/DL (ref 0.55–1.02)

## 2023-07-07 PROCEDURE — 6360000004 HC RX CONTRAST MEDICATION: Performed by: INTERNAL MEDICINE

## 2023-07-07 PROCEDURE — 71260 CT THORAX DX C+: CPT

## 2023-07-07 PROCEDURE — 36415 COLL VENOUS BLD VENIPUNCTURE: CPT

## 2023-07-07 PROCEDURE — 82565 ASSAY OF CREATININE: CPT

## 2023-07-07 PROCEDURE — 84520 ASSAY OF UREA NITROGEN: CPT

## 2023-07-07 RX ADMIN — IOPAMIDOL 100 ML: 755 INJECTION, SOLUTION INTRAVENOUS at 19:42

## 2023-07-24 ENCOUNTER — APPOINTMENT (OUTPATIENT)
Facility: HOSPITAL | Age: 68
End: 2023-07-24
Payer: COMMERCIAL

## 2023-07-24 ENCOUNTER — HOSPITAL ENCOUNTER (EMERGENCY)
Facility: HOSPITAL | Age: 68
Discharge: HOME | End: 2023-07-25
Attending: EMERGENCY MEDICINE
Payer: COMMERCIAL

## 2023-07-24 DIAGNOSIS — T14.8XXA HEMATOMA: ICD-10-CM
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
## 2023-07-24 PROCEDURE — 99284 EMERGENCY DEPT VISIT MOD MDM: CPT

## 2023-07-24 PROCEDURE — 72125 CT NECK SPINE W/O DYE: CPT

## 2023-07-24 ASSESSMENT — PAIN SCALES - GENERAL: PAINLEVEL_OUTOF10: 10

## 2023-07-24 ASSESSMENT — PAIN DESCRIPTION - LOCATION: LOCATION: BACK;HEAD

## 2023-07-24 ASSESSMENT — LIFESTYLE VARIABLES
HOW MANY STANDARD DRINKS CONTAINING ALCOHOL DO YOU HAVE ON A TYPICAL DAY: PATIENT DOES NOT DRINK
HOW OFTEN DO YOU HAVE A DRINK CONTAINING ALCOHOL: NEVER

## 2023-07-24 ASSESSMENT — PAIN - FUNCTIONAL ASSESSMENT: PAIN_FUNCTIONAL_ASSESSMENT: 0-10

## 2023-07-25 ENCOUNTER — APPOINTMENT (OUTPATIENT)
Facility: HOSPITAL | Age: 68
End: 2023-07-25
Payer: COMMERCIAL

## 2023-07-25 VITALS
HEART RATE: 76 BPM | DIASTOLIC BLOOD PRESSURE: 79 MMHG | BODY MASS INDEX: 18.73 KG/M2 | RESPIRATION RATE: 16 BRPM | SYSTOLIC BLOOD PRESSURE: 112 MMHG | WEIGHT: 102.4 LBS | OXYGEN SATURATION: 99 % | TEMPERATURE: 97.8 F

## 2023-07-25 PROCEDURE — 73120 X-RAY EXAM OF HAND: CPT

## 2023-07-25 PROCEDURE — 70450 CT HEAD/BRAIN W/O DYE: CPT

## 2023-07-25 NOTE — ED TRIAGE NOTES
Patient weak, lost balance fall. Hit head. Denies LOC. Patient on blood thinner. Injury to right side of anterior head and right hand. A/O. NAD.      Patient states has chemo on Monday and oxycodone taken at 2230

## 2023-07-25 NOTE — ED NOTES
NAD. Resting with eyes closed,respirations even and unlabored. Callbell in reach, siderails x2. Patient turned to left side. Patient A/O.  Declined drink/snack     Andriy Duke RN  07/25/23 7203       Andriy Duke RN  07/25/23 6780

## 2023-07-25 NOTE — ED NOTES
Patient given apple juice. Placed on back, did not with to turn. NAD. Respirations even and unlabored. Call bell in reach  Siderails x2.      Patient urinated in [address] [place], RN  07/25/23 7717 [facility], RN  07/25/23 7244

## 2023-07-25 NOTE — ED NOTES
Patient resting with eyes closed. NAD. Respirations even and unlabored. Call bell in reach, Siderails x2.       Aida Garcia RN  07/25/23 2995

## 2023-07-25 NOTE — ED NOTES
Patient  resting, eyes closed. NAD. Respirations even and unlabored. Call bell in reach. Side rails x2.       Wally Morales RN  07/25/23 1921

## 2023-07-25 NOTE — ED PROVIDER NOTES
[facility] EMERGENCY DEPT  EMERGENCY DEPARTMENT HISTORY AND PHYSICAL EXAM      Date: 7/24/2023  Patient Name: Stanley Horne  MRN: [id number]  [address] [facility] 1955  Date of evaluation: 7/24/2023  Provider: Milton Shin MD   Note Started: 1:18 AM EDT 7/25/23    HISTORY OF PRESENT ILLNESS     Chief Complaint   Patient presents with    Fall    Head Injury    Hand Injury     right       History Provided By: Patient    HPI: Stanley Horne is a 78 y.o. female with a past medical history of metastatic pancreatic cancer, hypertension and VTE presents for evaluation of mechanical ground-level fall in the bathroom. Did strike her head. Is on Eliquis. No loss of conscious. The other complaint is right hand pain. PAST MEDICAL HISTORY   Past Medical History:  Past Medical History:   Diagnosis Date    Cancer ([address])     pancreatic    HTN (hypertension)     Joint pain     Rheumatism     Thromboembolus ([address])     april 2022 , in abdomen pt states    Thyroid disorder        Past Surgical History:  Past Surgical History:   Procedure Laterality Date    HEENT      cataract    HYSTERECTOMY (CERVIX STATUS UNKNOWN)      IR PORT PLACEMENT EQUAL OR GREATER THAN 5 YEARS  1/19/2023    IR PORT PLACEMENT EQUAL OR GREATER THAN 5 YEARS 1/19/2023 [facility] RAD ANGIO IR    IR PORT PLACEMENT EQUAL OR GREATER THAN 5 YEARS  1/19/2023       Family History:  Family History   Problem Relation Age of Onset    Hypertension Mother        Social History:  Social History     Tobacco Use    Smoking status: Never    Smokeless tobacco: Never   Substance Use Topics    Alcohol use: No    Drug use: No       Allergies: Allergies   Allergen Reactions    Penicillins Itching       PCP: Erich Merrill MD    Current Meds:   No current facility-administered medications for this encounter.      Current Outpatient Medications   Medication Sig Dispense Refill    vitamin D (CHOLECALCIFEROL) 25 MCG (1000 UT) TABS tablet Take 2 tablets by mouth daily      acetaminophen level    2. Hematoma          DISPOSITION/PLAN   DISPOSITION Decision To Discharge 07/25/2023 01:18:47 AM    Discharge Note: The patient is stable for discharge home. The signs, symptoms, diagnosis, and discharge instructions have been discussed, understanding conveyed, and agreed upon. The patient is to follow up as recommended or return to ER should their symptoms worsen. PATIENT REFERRED TO:  Ruth Castañeda MD  [address] [id number] [phone]    In 2 days      [address]  [address] [place] [id number] [phone]    As needed        DISCHARGE MEDICATIONS:     Medication List        ASK your doctor about these medications      acetaminophen 325 MG tablet  Commonly known as: TYLENOL     amLODIPine-benazepril 10-40 MG per capsule  Commonly known as: LOTREL     apixaban 5 MG Tabs tablet  Commonly known as: ELIQUIS     clobetasol 0.05 % cream  Commonly known as: TEMOVATE     dicyclomine 10 MG capsule  Commonly known as: BENTYL     ergocalciferol 1.25 MG (41340 UT) capsule  Commonly known as: ERGOCALCIFEROL     folic acid 1 MG tablet  Commonly known as: FOLVITE     levothyroxine 112 MCG tablet  Commonly known as: SYNTHROID     lipase-protease-amylase 06141-38232 units delayed release capsule  Commonly known as: CREON     ondansetron 4 MG disintegrating tablet  Commonly known as: ZOFRAN-ODT     oxyCODONE 5 MG immediate release tablet  Commonly known as: ROXICODONE     vitamin D 25 MCG (1000 UT) Tabs tablet  Commonly known as: CHOLECALCIFEROL                DISCONTINUED MEDICATIONS:  Current Discharge Medication List          I am the Primary Clinician of Record. rDew Mclean MD (electronically signed)    (Please note that parts of this dictation were completed with voice recognition software. Quite often unanticipated grammatical, syntax, homophones, and other interpretive errors are inadvertently transcribed by the computer software.  Please

## 2023-07-25 NOTE — DISCHARGE INSTRUCTIONS
Thank you! Thank you for allowing me to care for you in the emergency department. It is my goal to provide you with excellent care. If you have not received excellent quality care, please ask to speak to the nurse manager. Please fill out the survey that will come to you by mail or email since we listen to your feedback! Below you will find a list of your tests from today's visit. Should you have any questions, please do not hesitate to call the emergency department. Labs  No results found for this or any previous visit (from the past 12 hour(s)). Radiologic Studies  CT CERVICAL SPINE WO CONTRAST   Final Result   Torticollis   No fracture   Multiple bilateral apical micronodules measuring between 2 to 3 mm. Possible   increase in size of one of these is compared to the July 2023 CT. Clinical   correlation needed to determine if these are infectious inflammatory or   neoplastic in nature. CT HEAD WO CONTRAST   Final Result   No acute intracranial process identified            XR HAND RIGHT (2 VIEWS)   Final Result   Erosive Osteoarthritis. No acute fracture        ------------------------------------------------------------------------------------------------------------  The exam and treatment you received in the Emergency Department were for an urgent problem and are not intended as complete care. It is important that you follow-up with a doctor, nurse practitioner, or physician assistant to:  (1) confirm your diagnosis,  (2) re-evaluation of changes in your illness and treatment, and  (3) for ongoing care. Please take your discharge instructions with you when you go to your follow-up appointment. If you have any problem arranging a follow-up appointment, contact the Emergency Department. If your symptoms become worse or you do not improve as expected and you are unable to reach your health care provider, please return to the Emergency Department. We are available 24 hours a day.      If a prescription has been provided, please have it filled as soon as possible to prevent a delay in treatment. If you have any questions or reservations about taking the medication due to side effects or interactions with other medications, please call your primary care provider or contact the ER.

## 2023-07-26 ENCOUNTER — CARE COORDINATION (OUTPATIENT)
Dept: OTHER | Facility: CLINIC | Age: 68
End: 2023-07-26

## 2023-07-26 NOTE — CARE COORDINATION
[facility]) contacted the patient to introduce the Associate Care Management Program related to ED visit to [facility] on 7/25. Nicolás Maza ACM reviewed and updated  -needs assistance with obtaining Medicare Part B. Patient states she has applied but never gets a response. patient was agreeable to follow up Care Management calls. Summary Note: Patient retired from [employer] last year. States she continues to pay out of pocket for [address] ($725/month). States she has medicare A, applied for part B but has not gotten a response. Would like assistance from . Patient is undergoing chemo for pancreatic CA w/ mets. States she has aide coming in daily to help with ADLs, paying out of pocket for this. Will send referral to social workers on [facility] team.     Patient fell, unsure how, maybe due to weakness from chemo. Discussed removing all cords, scatter rugs - ordered bedside commode, supposed to get this today. Son does live in the home with her. Aide comes daily to assist with ADLs. Provided Education:  Discussed red flags and appropriate site of care based on symptoms and resources available to patient including: PCP  Specialist  When to call 911. Provided the following associate/dependent related resources: [facility] Find a Provider: Teresita Todd [employer]. Download the free [employer] and create and account. Go to [email or web address] [employer] to create an account. Your copay is $15   [facility]: [phone]    Importance and benefits of: Follow up with PCP and specialist, medication adherence, self monitoring and reporting of symptoms. Plan:  ACM provided contact information for future needs. Plan for follow-up call in 5-7 days based on severity of symptoms and risk factors. Plan for next call: One week check in. Patient  verbalized understanding and is agreeable to follow up call.

## 2023-07-26 NOTE — CARE COORDINATION
MSW contacted patient for evaluation . Patient identifiers confirmed, zip code and . Referral sent to  by Nurse Lila Collins. Purpose of TC  Explore Medicare Part B and Medicare Part D. Patient concerns  Ms. Jesusita Camargo does not have Medicare Part B or Medicare Part D. She is paying out of pocket for COBRA. Ms. Jesusita Camargo stated that she has mailed a form to Medicare to have Part B added, however it wasn't approved. It appears that patient did not submit the necessary attachments with the form. Employment  Ms. Jesusita Camargo retired from [employer] 2022    Finances  Ms. Jesusita Camargo stated that she pays a significant amount of money for health insurance. Plan of action  MSW ACM attempted to complete the Medicare Part B form online but patient did not have the necessary information. Patient requested MSW ACM call her back in a week. ACM provided contact information for future needs.  Plan for follow-up call in 7-10 days      Goals Addressed                      This Visit's Progress      Patient will acquire Medicare Part B and D (pt-stated)         Patient will acquire Medicare Part B and Part D    Barriers: lack of support  Plan for overcoming my barriers: Patient needs support from family/friends  Confidence: 7/10  Anticipated Goal Completion Date: 10/01/23

## 2023-07-27 ENCOUNTER — HOSPITAL ENCOUNTER (INPATIENT)
Facility: HOSPITAL | Age: 68
LOS: 7 days | Discharge: NURSING, REHAB OR LONG-TERM CARE | DRG: 808 | End: 2023-08-03
Attending: STUDENT IN AN ORGANIZED HEALTH CARE EDUCATION/TRAINING PROGRAM | Admitting: INTERNAL MEDICINE
Payer: MEDICARE

## 2023-07-27 ENCOUNTER — APPOINTMENT (OUTPATIENT)
Facility: HOSPITAL | Age: 68
DRG: 808 | End: 2023-07-27
Payer: MEDICARE

## 2023-07-27 DIAGNOSIS — R42 LIGHT HEADEDNESS: Primary | ICD-10-CM

## 2023-07-27 DIAGNOSIS — C25.9 MALIGNANT NEOPLASM OF PANCREAS, UNSPECIFIED LOCATION OF MALIGNANCY (HCC): ICD-10-CM

## 2023-07-27 PROBLEM — R62.7 FAILURE TO THRIVE IN ADULT: Status: ACTIVE | Noted: 2023-07-27

## 2023-07-27 LAB
ALBUMIN SERPL-MCNC: 2.1 G/DL (ref 3.5–5)
ALBUMIN/GLOB SERPL: 0.7 (ref 1.1–2.2)
ALP SERPL-CCNC: 39 U/L (ref 45–117)
ALT SERPL-CCNC: 9 U/L (ref 12–78)
ANION GAP SERPL CALC-SCNC: 8 MMOL/L (ref 5–15)
AST SERPL W P-5'-P-CCNC: 22 U/L (ref 15–37)
BASOPHILS # BLD: 0 K/UL (ref 0–0.1)
BASOPHILS NFR BLD: 0 % (ref 0–1)
BILIRUB SERPL-MCNC: 0.9 MG/DL (ref 0.2–1)
BUN SERPL-MCNC: 33 MG/DL (ref 6–20)
BUN/CREAT SERPL: 21 (ref 12–20)
CA-I BLD-MCNC: 8.1 MG/DL (ref 8.5–10.1)
CHLORIDE SERPL-SCNC: 108 MMOL/L (ref 97–108)
CO2 SERPL-SCNC: 22 MMOL/L (ref 21–32)
CREAT SERPL-MCNC: 1.55 MG/DL (ref 0.55–1.02)
DIFFERENTIAL METHOD BLD: ABNORMAL
EOSINOPHIL # BLD: 0 K/UL (ref 0–0.4)
EOSINOPHIL NFR BLD: 1 % (ref 0–7)
ERYTHROCYTE [DISTWIDTH] IN BLOOD BY AUTOMATED COUNT: 15 % (ref 11.5–14.5)
GLOBULIN SER CALC-MCNC: 3.2 G/DL (ref 2–4)
GLUCOSE SERPL-MCNC: 110 MG/DL (ref 65–100)
HCT VFR BLD AUTO: 27.5 % (ref 35–47)
HGB BLD-MCNC: 9.1 G/DL (ref 11.5–16)
IMM GRANULOCYTES # BLD AUTO: 0.1 K/UL (ref 0–0.04)
IMM GRANULOCYTES NFR BLD AUTO: 2 % (ref 0–0.5)
LACTATE SERPL-SCNC: 1.3 MMOL/L (ref 0.4–2)
LIPASE SERPL-CCNC: 40 U/L (ref 73–393)
LYMPHOCYTES # BLD: 0.4 K/UL (ref 0.8–3.5)
LYMPHOCYTES NFR BLD: 12 % (ref 12–49)
MAGNESIUM SERPL-MCNC: 2.1 MG/DL (ref 1.6–2.4)
MCH RBC QN AUTO: 31.7 PG (ref 26–34)
MCHC RBC AUTO-ENTMCNC: 33.1 G/DL (ref 30–36.5)
MCV RBC AUTO: 95.8 FL (ref 80–99)
MONOCYTES # BLD: 0.3 K/UL (ref 0–1)
MONOCYTES NFR BLD: 9 % (ref 5–13)
NEUTS SEG # BLD: 2.5 K/UL (ref 1.8–8)
NEUTS SEG NFR BLD: 76 % (ref 32–75)
NRBC # BLD: 0 K/UL (ref 0–0.01)
NRBC BLD-RTO: 0 PER 100 WBC
PLATELET # BLD AUTO: 69 K/UL (ref 150–400)
PMV BLD AUTO: 11.5 FL (ref 8.9–12.9)
POTASSIUM SERPL-SCNC: 4.1 MMOL/L (ref 3.5–5.1)
PROT SERPL-MCNC: 5.3 G/DL (ref 6.4–8.2)
RBC # BLD AUTO: 2.87 M/UL (ref 3.8–5.2)
RBC MORPH BLD: ABNORMAL
SODIUM SERPL-SCNC: 138 MMOL/L (ref 136–145)
TROPONIN I SERPL HS-MCNC: 11 NG/L (ref 0–51)
WBC # BLD AUTO: 3.3 K/UL (ref 3.6–11)

## 2023-07-27 PROCEDURE — 2580000003 HC RX 258: Performed by: STUDENT IN AN ORGANIZED HEALTH CARE EDUCATION/TRAINING PROGRAM

## 2023-07-27 PROCEDURE — 83735 ASSAY OF MAGNESIUM: CPT

## 2023-07-27 PROCEDURE — 99285 EMERGENCY DEPT VISIT HI MDM: CPT

## 2023-07-27 PROCEDURE — 93005 ELECTROCARDIOGRAM TRACING: CPT | Performed by: STUDENT IN AN ORGANIZED HEALTH CARE EDUCATION/TRAINING PROGRAM

## 2023-07-27 PROCEDURE — 96374 THER/PROPH/DIAG INJ IV PUSH: CPT

## 2023-07-27 PROCEDURE — 84484 ASSAY OF TROPONIN QUANT: CPT

## 2023-07-27 PROCEDURE — 71045 X-RAY EXAM CHEST 1 VIEW: CPT

## 2023-07-27 PROCEDURE — 36415 COLL VENOUS BLD VENIPUNCTURE: CPT

## 2023-07-27 PROCEDURE — 96361 HYDRATE IV INFUSION ADD-ON: CPT

## 2023-07-27 PROCEDURE — 6360000002 HC RX W HCPCS: Performed by: STUDENT IN AN ORGANIZED HEALTH CARE EDUCATION/TRAINING PROGRAM

## 2023-07-27 PROCEDURE — 1100000000 HC RM PRIVATE

## 2023-07-27 PROCEDURE — 6370000000 HC RX 637 (ALT 250 FOR IP): Performed by: STUDENT IN AN ORGANIZED HEALTH CARE EDUCATION/TRAINING PROGRAM

## 2023-07-27 PROCEDURE — 83605 ASSAY OF LACTIC ACID: CPT

## 2023-07-27 PROCEDURE — 85025 COMPLETE CBC W/AUTO DIFF WBC: CPT

## 2023-07-27 PROCEDURE — 80053 COMPREHEN METABOLIC PANEL: CPT

## 2023-07-27 PROCEDURE — 83690 ASSAY OF LIPASE: CPT

## 2023-07-27 RX ORDER — ONDANSETRON 4 MG/1
4 TABLET, ORALLY DISINTEGRATING ORAL EVERY 8 HOURS PRN
Status: DISCONTINUED | OUTPATIENT
Start: 2023-07-27 | End: 2023-08-03 | Stop reason: HOSPADM

## 2023-07-27 RX ORDER — SODIUM CHLORIDE, SODIUM LACTATE, POTASSIUM CHLORIDE, AND CALCIUM CHLORIDE .6; .31; .03; .02 G/100ML; G/100ML; G/100ML; G/100ML
1000 INJECTION, SOLUTION INTRAVENOUS
Status: COMPLETED | OUTPATIENT
Start: 2023-07-27 | End: 2023-07-27

## 2023-07-27 RX ORDER — ACETAMINOPHEN 650 MG/1
650 SUPPOSITORY RECTAL EVERY 6 HOURS PRN
Status: DISCONTINUED | OUTPATIENT
Start: 2023-07-27 | End: 2023-08-03 | Stop reason: HOSPADM

## 2023-07-27 RX ORDER — ONDANSETRON 2 MG/ML
4 INJECTION INTRAMUSCULAR; INTRAVENOUS EVERY 6 HOURS PRN
Status: DISCONTINUED | OUTPATIENT
Start: 2023-07-27 | End: 2023-08-03 | Stop reason: HOSPADM

## 2023-07-27 RX ORDER — SODIUM CHLORIDE 0.9 % (FLUSH) 0.9 %
5-40 SYRINGE (ML) INJECTION PRN
Status: DISCONTINUED | OUTPATIENT
Start: 2023-07-27 | End: 2023-08-03 | Stop reason: HOSPADM

## 2023-07-27 RX ORDER — POLYETHYLENE GLYCOL 3350 17 G/17G
17 POWDER, FOR SOLUTION ORAL DAILY PRN
Status: DISCONTINUED | OUTPATIENT
Start: 2023-07-27 | End: 2023-08-03 | Stop reason: HOSPADM

## 2023-07-27 RX ORDER — METHOCARBAMOL 750 MG/1
1500 TABLET, FILM COATED ORAL ONCE
Status: COMPLETED | OUTPATIENT
Start: 2023-07-27 | End: 2023-07-27

## 2023-07-27 RX ORDER — ACETAMINOPHEN 325 MG/1
650 TABLET ORAL EVERY 6 HOURS PRN
Status: DISCONTINUED | OUTPATIENT
Start: 2023-07-27 | End: 2023-08-03 | Stop reason: HOSPADM

## 2023-07-27 RX ORDER — SODIUM CHLORIDE 9 MG/ML
INJECTION, SOLUTION INTRAVENOUS PRN
Status: DISCONTINUED | OUTPATIENT
Start: 2023-07-27 | End: 2023-08-03 | Stop reason: HOSPADM

## 2023-07-27 RX ORDER — ONDANSETRON 2 MG/ML
4 INJECTION INTRAMUSCULAR; INTRAVENOUS
Status: COMPLETED | OUTPATIENT
Start: 2023-07-27 | End: 2023-07-27

## 2023-07-27 RX ORDER — SODIUM CHLORIDE 0.9 % (FLUSH) 0.9 %
5-40 SYRINGE (ML) INJECTION EVERY 12 HOURS SCHEDULED
Status: DISCONTINUED | OUTPATIENT
Start: 2023-07-28 | End: 2023-08-03 | Stop reason: HOSPADM

## 2023-07-27 RX ADMIN — METHOCARBAMOL 1500 MG: 750 TABLET ORAL at 20:53

## 2023-07-27 RX ADMIN — SODIUM CHLORIDE, POTASSIUM CHLORIDE, SODIUM LACTATE AND CALCIUM CHLORIDE 1000 ML: 600; 310; 30; 20 INJECTION, SOLUTION INTRAVENOUS at 20:10

## 2023-07-27 RX ADMIN — ONDANSETRON 4 MG: 2 INJECTION INTRAMUSCULAR; INTRAVENOUS at 20:53

## 2023-07-27 RX ADMIN — SODIUM CHLORIDE, POTASSIUM CHLORIDE, SODIUM LACTATE AND CALCIUM CHLORIDE 1000 ML: 600; 310; 30; 20 INJECTION, SOLUTION INTRAVENOUS at 21:44

## 2023-07-27 NOTE — ED TRIAGE NOTES
Pt arrived to the ED via EMS. Pt is from home. Pt complains of weakness, dizziness, and hypotension. Pt has a dx of pancreatic cancer. Vitals stable during transport. Symptoms started yesterday and today.

## 2023-07-28 PROBLEM — K86.89 PANCREATIC MASS: Status: ACTIVE | Noted: 2022-04-22

## 2023-07-28 LAB
ANION GAP SERPL CALC-SCNC: 6 MMOL/L (ref 5–15)
APPEARANCE UR: CLEAR
BACTERIA URNS QL MICRO: NEGATIVE /HPF
BASOPHILS # BLD: 0 K/UL (ref 0–0.1)
BASOPHILS NFR BLD: 0 % (ref 0–1)
BILIRUB UR QL: NEGATIVE
BUN SERPL-MCNC: 31 MG/DL (ref 6–20)
BUN/CREAT SERPL: 24 (ref 12–20)
CA-I BLD-MCNC: 8.6 MG/DL (ref 8.5–10.1)
CHLORIDE SERPL-SCNC: 109 MMOL/L (ref 97–108)
CO2 SERPL-SCNC: 24 MMOL/L (ref 21–32)
COLOR UR: ABNORMAL
CREAT SERPL-MCNC: 1.3 MG/DL (ref 0.55–1.02)
DIFFERENTIAL METHOD BLD: ABNORMAL
EKG ATRIAL RATE: 69 BPM
EKG DIAGNOSIS: NORMAL
EKG P AXIS: 62 DEGREES
EKG P-R INTERVAL: 164 MS
EKG Q-T INTERVAL: 438 MS
EKG QRS DURATION: 78 MS
EKG QTC CALCULATION (BAZETT): 469 MS
EKG R AXIS: 72 DEGREES
EKG T AXIS: 59 DEGREES
EKG VENTRICULAR RATE: 69 BPM
EOSINOPHIL # BLD: 0 K/UL (ref 0–0.4)
EOSINOPHIL NFR BLD: 1 % (ref 0–7)
EPITH CASTS URNS QL MICRO: ABNORMAL /LPF
ERYTHROCYTE [DISTWIDTH] IN BLOOD BY AUTOMATED COUNT: 15 % (ref 11.5–14.5)
GLUCOSE BLD STRIP.AUTO-MCNC: 93 MG/DL (ref 65–100)
GLUCOSE SERPL-MCNC: 86 MG/DL (ref 65–100)
GLUCOSE UR STRIP.AUTO-MCNC: NEGATIVE MG/DL
HCT VFR BLD AUTO: 30 % (ref 35–47)
HGB BLD-MCNC: 10.3 G/DL (ref 11.5–16)
HGB UR QL STRIP: NEGATIVE
IMM GRANULOCYTES # BLD AUTO: 0 K/UL (ref 0–0.04)
IMM GRANULOCYTES NFR BLD AUTO: 1 % (ref 0–0.5)
KETONES UR QL STRIP.AUTO: NEGATIVE MG/DL
LEUKOCYTE ESTERASE UR QL STRIP.AUTO: NEGATIVE
LYMPHOCYTES # BLD: 0.4 K/UL (ref 0.8–3.5)
LYMPHOCYTES NFR BLD: 14 % (ref 12–49)
MCH RBC QN AUTO: 32.1 PG (ref 26–34)
MCHC RBC AUTO-ENTMCNC: 34.3 G/DL (ref 30–36.5)
MCV RBC AUTO: 93.5 FL (ref 80–99)
MONOCYTES # BLD: 0.3 K/UL (ref 0–1)
MONOCYTES NFR BLD: 9 % (ref 5–13)
MUCOUS THREADS URNS QL MICRO: ABNORMAL /LPF
NEUTS SEG # BLD: 2.3 K/UL (ref 1.8–8)
NEUTS SEG NFR BLD: 75 % (ref 32–75)
NITRITE UR QL STRIP.AUTO: NEGATIVE
NRBC # BLD: 0 K/UL (ref 0–0.01)
NRBC BLD-RTO: 0 PER 100 WBC
PERFORMED BY:: NORMAL
PH UR STRIP: 5 (ref 5–8)
PLATELET # BLD AUTO: 77 K/UL (ref 150–400)
PMV BLD AUTO: 10.4 FL (ref 8.9–12.9)
POTASSIUM SERPL-SCNC: 4.1 MMOL/L (ref 3.5–5.1)
PROT UR STRIP-MCNC: NEGATIVE MG/DL
RBC # BLD AUTO: 3.21 M/UL (ref 3.8–5.2)
RBC #/AREA URNS HPF: ABNORMAL /HPF (ref 0–5)
SODIUM SERPL-SCNC: 139 MMOL/L (ref 136–145)
SP GR UR REFRACTOMETRY: 1.02 (ref 1–1.03)
URINE CULTURE IF INDICATED: ABNORMAL
UROBILINOGEN UR QL STRIP.AUTO: 0.1 EU/DL (ref 0.1–1)
WBC # BLD AUTO: 3.1 K/UL (ref 3.6–11)
WBC URNS QL MICRO: ABNORMAL /HPF (ref 0–4)

## 2023-07-28 PROCEDURE — 97530 THERAPEUTIC ACTIVITIES: CPT

## 2023-07-28 PROCEDURE — 82962 GLUCOSE BLOOD TEST: CPT

## 2023-07-28 PROCEDURE — 1100000000 HC RM PRIVATE

## 2023-07-28 PROCEDURE — 6360000002 HC RX W HCPCS: Performed by: INTERNAL MEDICINE

## 2023-07-28 PROCEDURE — 2580000003 HC RX 258: Performed by: NURSE PRACTITIONER

## 2023-07-28 PROCEDURE — 85025 COMPLETE CBC W/AUTO DIFF WBC: CPT

## 2023-07-28 PROCEDURE — 97161 PT EVAL LOW COMPLEX 20 MIN: CPT

## 2023-07-28 PROCEDURE — 6370000000 HC RX 637 (ALT 250 FOR IP): Performed by: NURSE PRACTITIONER

## 2023-07-28 PROCEDURE — 80048 BASIC METABOLIC PNL TOTAL CA: CPT

## 2023-07-28 PROCEDURE — 6370000000 HC RX 637 (ALT 250 FOR IP): Performed by: INTERNAL MEDICINE

## 2023-07-28 PROCEDURE — 2580000003 HC RX 258: Performed by: INTERNAL MEDICINE

## 2023-07-28 PROCEDURE — 81001 URINALYSIS AUTO W/SCOPE: CPT

## 2023-07-28 PROCEDURE — 36415 COLL VENOUS BLD VENIPUNCTURE: CPT

## 2023-07-28 PROCEDURE — P9047 ALBUMIN (HUMAN), 25%, 50ML: HCPCS | Performed by: INTERNAL MEDICINE

## 2023-07-28 PROCEDURE — 97165 OT EVAL LOW COMPLEX 30 MIN: CPT

## 2023-07-28 PROCEDURE — 92610 EVALUATE SWALLOWING FUNCTION: CPT

## 2023-07-28 RX ORDER — MAGNESIUM CARB/ALUMINUM HYDROX 105-160MG
TABLET,CHEWABLE ORAL
Status: ON HOLD | COMMUNITY
Start: 2023-06-23 | End: 2023-08-02 | Stop reason: HOSPADM

## 2023-07-28 RX ORDER — LOSARTAN POTASSIUM 50 MG/1
100 TABLET ORAL DAILY
Status: DISCONTINUED | OUTPATIENT
Start: 2023-07-28 | End: 2023-07-28

## 2023-07-28 RX ORDER — MEGESTROL ACETATE 40 MG/ML
400 SUSPENSION ORAL DAILY
Status: DISCONTINUED | OUTPATIENT
Start: 2023-07-28 | End: 2023-08-03 | Stop reason: HOSPADM

## 2023-07-28 RX ORDER — OXYCODONE HYDROCHLORIDE 5 MG/1
5 TABLET ORAL 3 TIMES DAILY PRN
Status: DISCONTINUED | OUTPATIENT
Start: 2023-07-28 | End: 2023-08-03 | Stop reason: HOSPADM

## 2023-07-28 RX ORDER — OLMESARTAN MEDOXOMIL / AMLODIPINE BESYLATE / HYDROCHLOROTHIAZIDE 40; 10; 12.5 MG/1; MG/1; MG/1
40 TABLET, FILM COATED ORAL DAILY
Status: DISCONTINUED | OUTPATIENT
Start: 2023-07-28 | End: 2023-07-28

## 2023-07-28 RX ORDER — HYDROCHLOROTHIAZIDE 25 MG/1
12.5 TABLET ORAL DAILY
Status: DISCONTINUED | OUTPATIENT
Start: 2023-07-28 | End: 2023-07-28

## 2023-07-28 RX ORDER — OXYCODONE HYDROCHLORIDE 5 MG/1
CAPSULE ORAL
Status: ON HOLD | COMMUNITY
Start: 2023-06-29 | End: 2023-08-02 | Stop reason: HOSPADM

## 2023-07-28 RX ORDER — OLMESARTAN MEDOXOMIL / AMLODIPINE BESYLATE / HYDROCHLOROTHIAZIDE 40; 10; 12.5 MG/1; MG/1; MG/1
TABLET, FILM COATED ORAL
Status: ON HOLD | COMMUNITY
End: 2023-08-02 | Stop reason: HOSPADM

## 2023-07-28 RX ORDER — AMLODIPINE BESYLATE 5 MG/1
10 TABLET ORAL DAILY
Status: DISCONTINUED | OUTPATIENT
Start: 2023-07-28 | End: 2023-07-28

## 2023-07-28 RX ORDER — SODIUM CHLORIDE, SODIUM LACTATE, POTASSIUM CHLORIDE, CALCIUM CHLORIDE 600; 310; 30; 20 MG/100ML; MG/100ML; MG/100ML; MG/100ML
INJECTION, SOLUTION INTRAVENOUS CONTINUOUS
Status: DISPENSED | OUTPATIENT
Start: 2023-07-28 | End: 2023-07-29

## 2023-07-28 RX ORDER — ALBUMIN (HUMAN) 12.5 G/50ML
25 SOLUTION INTRAVENOUS ONCE
Status: COMPLETED | OUTPATIENT
Start: 2023-07-28 | End: 2023-07-28

## 2023-07-28 RX ORDER — SODIUM CHLORIDE, SODIUM LACTATE, POTASSIUM CHLORIDE, CALCIUM CHLORIDE 600; 310; 30; 20 MG/100ML; MG/100ML; MG/100ML; MG/100ML
INJECTION, SOLUTION INTRAVENOUS CONTINUOUS
Status: DISPENSED | OUTPATIENT
Start: 2023-07-28 | End: 2023-07-28

## 2023-07-28 RX ADMIN — SODIUM CHLORIDE, PRESERVATIVE FREE 10 ML: 5 INJECTION INTRAVENOUS at 21:11

## 2023-07-28 RX ADMIN — MEGESTROL ACETATE 400 MG: 40 SUSPENSION ORAL at 10:33

## 2023-07-28 RX ADMIN — SODIUM CHLORIDE, POTASSIUM CHLORIDE, SODIUM LACTATE AND CALCIUM CHLORIDE: 600; 310; 30; 20 INJECTION, SOLUTION INTRAVENOUS at 23:02

## 2023-07-28 RX ADMIN — OXYCODONE HYDROCHLORIDE 5 MG: 5 TABLET ORAL at 14:25

## 2023-07-28 RX ADMIN — SODIUM CHLORIDE, PRESERVATIVE FREE 10 ML: 5 INJECTION INTRAVENOUS at 10:33

## 2023-07-28 RX ADMIN — ALBUMIN (HUMAN) 25 G: 0.25 INJECTION, SOLUTION INTRAVENOUS at 05:34

## 2023-07-28 RX ADMIN — SODIUM CHLORIDE, POTASSIUM CHLORIDE, SODIUM LACTATE AND CALCIUM CHLORIDE: 600; 310; 30; 20 INJECTION, SOLUTION INTRAVENOUS at 00:15

## 2023-07-28 ASSESSMENT — PAIN SCALES - GENERAL
PAINLEVEL_OUTOF10: 8
PAINLEVEL_OUTOF10: 6
PAINLEVEL_OUTOF10: 0

## 2023-07-28 ASSESSMENT — PAIN DESCRIPTION - LOCATION: LOCATION: BACK

## 2023-07-28 NOTE — CONSULTS
Comprehensive Nutrition Assessment    Type and Reason for Visit:  Initial, Consult, Positive Nutrition Screen    Nutrition Recommendations/Plan:   Continue current PO diet  Ensure Enlive 3x/d (1050 kcals, 60g pro)  Encourage PO intake, document % consumed in I/O     Malnutrition Assessment:  Malnutrition Status:  Severe malnutrition (07/28/23 1847)    Context:  Chronic Illness     Findings of the 6 clinical characteristics of malnutrition:  Energy Intake:  Unable to assess  Weight Loss:  Greater than 20% over 1 year (33% x 18 months?)     Body Fat Loss:  Severe body fat loss (cachectic appearance, obvious wasting) Orbital   Muscle Mass Loss:  Severe muscle mass loss (cachectic appearance, obvious wasting) Clavicles (pectoralis & deltoids), Scapula (trapezius), Temples (temporalis), Calf (gastrocnemius), Thigh (quadraceps)  Fluid Accumulation:  No significant fluid accumulation     Strength:  Not Performed    Nutrition Assessment:    Pt admitted for FTT and poor po, +pancytopenia and COURTNEY, CT abd finding ascites but no SBO. Pt w/active pancreatic CA on chemo. UTO nutr/wt hx, per SLP note pt with poor appetite, malaise, and metallic taste d/t chemo. No intakes in EMR at this time. RDs consulted for poor po. Pt initially on M/M5 w/0% B eaten this AM per RN, SLP advanced to regular/thins this PM. Will add ONS to better meet needs in setting of hypermetabolic disease. Labs: BUN 31, Cr 1.30, GFR 45, Alb 2.1, Alk Phos 39, ALT 9, H/H 10.3/30.0. Meds: megace, robaxin, zofran prn. Nutrition Related Findings:    NFPE deferred, noted pt appears cachectic. No n/v/d/c, last BM pta. SLP eval'd in PM -cleared for regular/thins diet. No edema. Wound Type: None         Current Nutrition Intake & Therapies:    Average Meal Intake: 0%  Average Supplements Intake: None Ordered  ADULT DIET;  Regular  ADULT ORAL NUTRITION SUPPLEMENT; Breakfast, Lunch, Dinner; Standard High Calorie/High Protein Oral Supplement    Anthropometric

## 2023-07-28 NOTE — ED PROVIDER NOTES
[facility] EMERGENCY DEPT  EMERGENCY DEPARTMENT HISTORY AND PHYSICAL EXAM      Date: 7/27/2023  Patient Name: Jono Pardo  MRN: [id number]  [address] [facility] 1955  Date of evaluation: 7/27/2023  Provider: Oscar Frost DO   Note Started: 10:50 PM EDT 7/27/23    HISTORY OF PRESENT ILLNESS     Chief Complaint   Patient presents with    Dizziness       History Provided By: Patient    HPI: Jono Pardo is a 80 y.o. female with history of pancreatic cancer, hypothyroidism and hypertension presents to the emergency department due to failure to thrive and unable to tolerate p.o. Patient noted to have progressively worsening lightheadedness when she feels like she is going to faint that has been ongoing for the past 3 days. Patient notes that she had a near syncopal episode today that prompted her visit to the emergency department. Family at bedside states that the patient has not tolerated anything by mouth over the past 2 days. Notes of associated diarrhea. Patient is currently receiving chemotherapy, last received last Monday. Denies any fevers, new abdominal pain, urinary symptoms, chest pain, shortness of breath or any other symptoms required.     PAST MEDICAL HISTORY   Past Medical History:  Past Medical History:   Diagnosis Date    Cancer ([address])     pancreatic    HTN (hypertension)     Joint pain     Rheumatism     Thromboembolus ([address])     april 2022 , in abdomen pt states    Thyroid disorder        Past Surgical History:  Past Surgical History:   Procedure Laterality Date    HEENT      cataract    HYSTERECTOMY (CERVIX STATUS UNKNOWN)      IR PORT PLACEMENT EQUAL OR GREATER THAN 5 YEARS  1/19/2023    IR PORT PLACEMENT EQUAL OR GREATER THAN 5 YEARS 1/19/2023 [facility] RAD ANGIO IR    IR PORT PLACEMENT EQUAL OR GREATER THAN 5 YEARS  1/19/2023       Family History:  Family History   Problem Relation Age of Onset    Hypertension Mother        Social History:  Social History     Tobacco Use    Smoking status: Never

## 2023-07-28 NOTE — CARE COORDINATION
07/28/23 1546   Service Assessment   Patient Orientation Alert and Oriented   Cognition Alert   History Provided By Patient   Primary [address] Children;Family Members;Friends/Neighbors   Patient's Healthcare Decision Maker is: Legal Next of Kin   PCP Verified by CM Yes  (Dr. Hicks Drivers)   Last Visit to PCP Within last two years   Prior Functional Level Assistance with the following:;Mobility   Current Functional Level Assistance with the following:;Mobility   Can patient return to prior living arrangement Yes   Ability to make needs known: Good   Family able to assist with home care needs: Yes   Would you like for me to discuss the discharge plan with any other family members/significant others, and if so, who? No   Financial Resources None   [employer] None   Social/Functional History   Lives With Son   Type of [address] [place] [address] Dr Two level   [address] to enter with rails   Entrance Stairs - Number of Steps 1   Home Equipment Rollator;Cane   Active  No   Patient's  Info son/friends/family     CM met with patient in room to complete DCP assessment. Patient currently lives in a two story house with her son, one step to the entrance, address on chart confirmed. Patient uses a cane at home, mostly the rollator. Patient's son, sister, and close friends assist with all other needs, no other DME in the house. Patient not current with PCP, has been seeing Oncologist, son transports to her appointments. Patient utilizes [facility] on [address] or delivery meds.     CM continues to follow and monitor for needs

## 2023-07-28 NOTE — CONSULTS
Hematology Oncology Consultation    Reason for consult: Pancreatic cancer    Admitting Diagnosis: Light headedness [R42]  Failure to thrive in adult [R62.7]  Malignant neoplasm of pancreas, unspecified location of malignancy ([address]) [C25.9]    Impression:     Metastatic pancreatic cancer  -well known to me from OP care  -stable disease on imaging 7/7/23; currently in C7 of gem/abraxane  -substantial pre treatment weight loss and preformance status decline as consequence of pancreatic insuffiency, creon with substantial help but remains very active with our dietician for recovery support  -borderline preformance status which typically recovers prior to systemic therapy, next due on 7/31/23, expect this will be held    2. Pancytopenia  -related to systemic therapy esthela Gemcitabine  -transfuse if hgb <7s or plts <10s otherwise supportive care    3. Ascites  -potentially malignancy related vs hypoalbuminemia/hepatic     Discussion: Melissa Toscano is a  80y.o. year old seen in consultation at the request of Dr. Gutierrez Patient for pancreatic cancer  Well known to me, had rough course of several months before being diagnosed with pancreatic cancer with severe n/v weight loss and pancreatic insuffiency. With creon has started to stabilize her weight and oral intake. Placed on systemic therapy; lower dose/more frequent infusions to increase tolerance; requiring transfusional support and often treatmetn breaks , but still with stable disease on imaging    Presented with weakness, dehydration and pancytopenia following therapy.     Imaging:    Reviewed    Labs:    Recent Results (from the past 24 hour(s))   Lactic Acid    Collection Time: 07/27/23  7:59 PM   Result Value Ref Range    Lactic Acid, Plasma 1.3 0.4 - 2.0 mmol/L   EKG 12 Lead    Collection Time: 07/27/23  8:07 PM   Result Value Ref Range    Ventricular Rate 69 BPM    Atrial Rate 69 BPM    P-R Interval 164 ms    QRS Duration 78 ms    Q-T Interval 438 ms    QTc Calculation

## 2023-07-28 NOTE — THERAPY EVALUATION
impairment  Oral Residue: None  Initiation of Swallow: No impairment  Laryngeal Elevation: Functional  Aspiration Signs/Symptoms: None    Voice:  Vocal Quality: Low volume        After Treatment:  Patient left in no apparent distress in bed, Call bell left within reach, Nursing notified, Caregiver present, and Bed alarm activated     Pain:  VAS (numerical) 0    COMMUNICATION/EDUCATION:   Swallow safety precautions and Diet recommendations education provided to Patient via explanation, all questions/concerns addressed. Patient verbalizes understanding. No further SLP Intervention and recommended diet changes were discussed with: Registered nurse, NP.     Thank you,  CHELSEA Sepulveda  Minutes: 11

## 2023-07-28 NOTE — ED NOTES
Called lab back, supervisor stated that labs still cannot be found.  Primary RN to redraw labs and walk them to the lab       Latoya Augustine RN  07/27/23 4042

## 2023-07-28 NOTE — ED NOTES
Contacted lab in regards to pt labs, [facility] states that they do not have labs for pt. Writer informed them that one of the labs had been resulted so the labs were sent and should be found.  MD made aware as well     Miroslava Welch RN  07/27/23 4761

## 2023-07-28 NOTE — H&P
History and Physical    Patient: Leighann Abernathy MRN: [id number]  SSN: [id number]    YOB: 1955  Age: 80 y.o. Sex: female      Subjective:      Leighann Abernathy is a 80 y.o. female with PMH of SMA thrombosis,  pancreatic cancer, hypertension presented to the ED with chief complaint of poor oral intake and generalized weakness. Family noticed that she has been unable to tolerate PO intake in the past week. Reports abdominal discomfort with PO intake and nausea, however has no vomiting. Also has diarrhea. Otherwise no fever or chills. Patient is poor historian due to extreme fatigue, however reports decreased appetite. Of note, she has pancreatic cancer in which she is receiving chemotherapy, last session on previous Monday. In the ED, noted hypotensive. Lab significant for pancytopenia and COURTNEY. Chest X-ray no acute finding. . recent CT abdomen (7/7) showed showed increased ascites but no bowel obstruction mentioned. Case discussed with ED physician and agree that patient require inpatient admission/ observation for further management. Chart review: none     Past Medical History:   Diagnosis Date    Cancer ([address])     pancreatic    HTN (hypertension)     Joint pain     Rheumatism     Thromboembolus ([address])     april 2022 , in abdomen pt states    Thyroid disorder      Family History   Problem Relation Age of Onset    Hypertension Mother      Social History     Tobacco Use    Smoking status: Never    Smokeless tobacco: Never   Substance Use Topics    Alcohol use: No        Objective:     Physical Exam:   General: fatigue looking, cooperative, chronically ill appearing  Eye: conjunctivae/corneas clear. PERRL, EOM's intact. Throat and Neck: normal and no erythema or exudates noted. No mass   Lung: clear to auscultation bilaterally  Heart: regular rate and rhythm,   Abdomen: soft, non-tender. Bowel sounds normal. No masses,  Extremities: 2+ LE edema.  able to move all extremities normal,

## 2023-07-29 LAB
GLUCOSE BLD STRIP.AUTO-MCNC: 114 MG/DL (ref 65–100)
GLUCOSE BLD STRIP.AUTO-MCNC: 132 MG/DL (ref 65–100)
PERFORMED BY:: ABNORMAL
PERFORMED BY:: ABNORMAL

## 2023-07-29 PROCEDURE — 6370000000 HC RX 637 (ALT 250 FOR IP): Performed by: INTERNAL MEDICINE

## 2023-07-29 PROCEDURE — 2580000003 HC RX 258: Performed by: INTERNAL MEDICINE

## 2023-07-29 PROCEDURE — 2580000003 HC RX 258: Performed by: NURSE PRACTITIONER

## 2023-07-29 PROCEDURE — 97530 THERAPEUTIC ACTIVITIES: CPT

## 2023-07-29 PROCEDURE — 1100000000 HC RM PRIVATE

## 2023-07-29 PROCEDURE — 82962 GLUCOSE BLOOD TEST: CPT

## 2023-07-29 PROCEDURE — 6370000000 HC RX 637 (ALT 250 FOR IP): Performed by: NURSE PRACTITIONER

## 2023-07-29 RX ORDER — DEXTROSE AND SODIUM CHLORIDE 5; .45 G/100ML; G/100ML
INJECTION, SOLUTION INTRAVENOUS CONTINUOUS
Status: DISCONTINUED | OUTPATIENT
Start: 2023-07-29 | End: 2023-08-03 | Stop reason: HOSPADM

## 2023-07-29 RX ADMIN — DEXTROSE AND SODIUM CHLORIDE: 5; 450 INJECTION, SOLUTION INTRAVENOUS at 20:27

## 2023-07-29 RX ADMIN — OXYCODONE HYDROCHLORIDE 5 MG: 5 TABLET ORAL at 22:18

## 2023-07-29 RX ADMIN — SODIUM CHLORIDE, PRESERVATIVE FREE 10 ML: 5 INJECTION INTRAVENOUS at 08:00

## 2023-07-29 RX ADMIN — MEGESTROL ACETATE 400 MG: 40 SUSPENSION ORAL at 08:05

## 2023-07-29 RX ADMIN — DEXTROSE AND SODIUM CHLORIDE: 5; 450 INJECTION, SOLUTION INTRAVENOUS at 11:55

## 2023-07-29 ASSESSMENT — PAIN SCALES - GENERAL
PAINLEVEL_OUTOF10: 10
PAINLEVEL_OUTOF10: 8

## 2023-07-29 ASSESSMENT — PAIN DESCRIPTION - LOCATION: LOCATION: BACK

## 2023-07-30 LAB
GLUCOSE BLD STRIP.AUTO-MCNC: 132 MG/DL (ref 65–100)
PERFORMED BY:: ABNORMAL

## 2023-07-30 PROCEDURE — 1100000000 HC RM PRIVATE

## 2023-07-30 PROCEDURE — 82962 GLUCOSE BLOOD TEST: CPT

## 2023-07-30 PROCEDURE — 6370000000 HC RX 637 (ALT 250 FOR IP): Performed by: NURSE PRACTITIONER

## 2023-07-30 PROCEDURE — 6370000000 HC RX 637 (ALT 250 FOR IP): Performed by: INTERNAL MEDICINE

## 2023-07-30 PROCEDURE — 2580000003 HC RX 258: Performed by: INTERNAL MEDICINE

## 2023-07-30 PROCEDURE — 2580000003 HC RX 258: Performed by: NURSE PRACTITIONER

## 2023-07-30 RX ADMIN — SODIUM CHLORIDE, PRESERVATIVE FREE 10 ML: 5 INJECTION INTRAVENOUS at 22:04

## 2023-07-30 RX ADMIN — DEXTROSE AND SODIUM CHLORIDE: 5; 450 INJECTION, SOLUTION INTRAVENOUS at 18:30

## 2023-07-30 RX ADMIN — SODIUM CHLORIDE, PRESERVATIVE FREE 10 ML: 5 INJECTION INTRAVENOUS at 09:18

## 2023-07-30 RX ADMIN — MEGESTROL ACETATE 400 MG: 40 SUSPENSION ORAL at 09:18

## 2023-07-30 RX ADMIN — ACETAMINOPHEN 650 MG: 325 TABLET ORAL at 16:12

## 2023-07-30 RX ADMIN — OXYCODONE HYDROCHLORIDE 5 MG: 5 TABLET ORAL at 04:29

## 2023-07-30 RX ADMIN — DEXTROSE AND SODIUM CHLORIDE: 5; 450 INJECTION, SOLUTION INTRAVENOUS at 06:34

## 2023-07-30 RX ADMIN — ACETAMINOPHEN 650 MG: 325 TABLET ORAL at 22:04

## 2023-07-30 ASSESSMENT — PAIN DESCRIPTION - LOCATION
LOCATION: BACK
LOCATION: BACK;HEAD
LOCATION: BACK

## 2023-07-30 ASSESSMENT — PAIN SCALES - GENERAL
PAINLEVEL_OUTOF10: 10
PAINLEVEL_OUTOF10: 8
PAINLEVEL_OUTOF10: 10
PAINLEVEL_OUTOF10: 10
PAINLEVEL_OUTOF10: 0

## 2023-07-30 ASSESSMENT — PAIN DESCRIPTION - ORIENTATION: ORIENTATION: MID

## 2023-07-30 ASSESSMENT — PAIN DESCRIPTION - DESCRIPTORS: DESCRIPTORS: ACHING

## 2023-07-30 ASSESSMENT — PAIN - FUNCTIONAL ASSESSMENT: PAIN_FUNCTIONAL_ASSESSMENT: ACTIVITIES ARE NOT PREVENTED

## 2023-07-31 ENCOUNTER — APPOINTMENT (OUTPATIENT)
Facility: HOSPITAL | Age: 68
DRG: 808 | End: 2023-07-31
Payer: MEDICARE

## 2023-07-31 PROCEDURE — 6370000000 HC RX 637 (ALT 250 FOR IP): Performed by: INTERNAL MEDICINE

## 2023-07-31 PROCEDURE — 6370000000 HC RX 637 (ALT 250 FOR IP): Performed by: NURSE PRACTITIONER

## 2023-07-31 PROCEDURE — 2580000003 HC RX 258: Performed by: INTERNAL MEDICINE

## 2023-07-31 PROCEDURE — 71046 X-RAY EXAM CHEST 2 VIEWS: CPT

## 2023-07-31 PROCEDURE — 2580000003 HC RX 258: Performed by: NURSE PRACTITIONER

## 2023-07-31 PROCEDURE — 1100000000 HC RM PRIVATE

## 2023-07-31 RX ADMIN — DEXTROSE AND SODIUM CHLORIDE: 5; 450 INJECTION, SOLUTION INTRAVENOUS at 14:41

## 2023-07-31 RX ADMIN — OXYCODONE HYDROCHLORIDE 5 MG: 5 TABLET ORAL at 13:33

## 2023-07-31 RX ADMIN — DEXTROSE AND SODIUM CHLORIDE: 5; 450 INJECTION, SOLUTION INTRAVENOUS at 04:53

## 2023-07-31 RX ADMIN — MEGESTROL ACETATE 400 MG: 40 SUSPENSION ORAL at 10:47

## 2023-07-31 RX ADMIN — SODIUM CHLORIDE, PRESERVATIVE FREE 10 ML: 5 INJECTION INTRAVENOUS at 23:34

## 2023-07-31 RX ADMIN — SODIUM CHLORIDE, PRESERVATIVE FREE 10 ML: 5 INJECTION INTRAVENOUS at 10:48

## 2023-07-31 RX ADMIN — ACETAMINOPHEN 650 MG: 325 TABLET ORAL at 10:47

## 2023-07-31 ASSESSMENT — PAIN DESCRIPTION - LOCATION
LOCATION: BACK
LOCATION: BACK

## 2023-07-31 ASSESSMENT — PAIN SCALES - GENERAL
PAINLEVEL_OUTOF10: 10
PAINLEVEL_OUTOF10: 10

## 2023-07-31 ASSESSMENT — PAIN - FUNCTIONAL ASSESSMENT
PAIN_FUNCTIONAL_ASSESSMENT: PREVENTS OR INTERFERES SOME ACTIVE ACTIVITIES AND ADLS
PAIN_FUNCTIONAL_ASSESSMENT: PREVENTS OR INTERFERES SOME ACTIVE ACTIVITIES AND ADLS

## 2023-07-31 ASSESSMENT — PAIN DESCRIPTION - DESCRIPTORS
DESCRIPTORS: ACHING
DESCRIPTORS: ACHING

## 2023-07-31 ASSESSMENT — PAIN DESCRIPTION - ORIENTATION
ORIENTATION: LEFT
ORIENTATION: LEFT

## 2023-08-01 LAB
ALBUMIN SERPL-MCNC: 1.9 G/DL (ref 3.5–5)
ALBUMIN/GLOB SERPL: 0.6 (ref 1.1–2.2)
ALP SERPL-CCNC: 35 U/L (ref 45–117)
ALT SERPL-CCNC: 9 U/L (ref 12–78)
ANION GAP SERPL CALC-SCNC: 6 MMOL/L (ref 5–15)
AST SERPL W P-5'-P-CCNC: 14 U/L (ref 15–37)
BASOPHILS # BLD: 0 K/UL (ref 0–0.1)
BASOPHILS NFR BLD: 0 % (ref 0–1)
BILIRUB SERPL-MCNC: 0.6 MG/DL (ref 0.2–1)
BUN SERPL-MCNC: 20 MG/DL (ref 6–20)
BUN/CREAT SERPL: 16 (ref 12–20)
CA-I BLD-MCNC: 7.6 MG/DL (ref 8.5–10.1)
CHLORIDE SERPL-SCNC: 111 MMOL/L (ref 97–108)
CO2 SERPL-SCNC: 20 MMOL/L (ref 21–32)
CREAT SERPL-MCNC: 1.26 MG/DL (ref 0.55–1.02)
DIFFERENTIAL METHOD BLD: ABNORMAL
EOSINOPHIL # BLD: 0.1 K/UL (ref 0–0.4)
EOSINOPHIL NFR BLD: 3 % (ref 0–7)
ERYTHROCYTE [DISTWIDTH] IN BLOOD BY AUTOMATED COUNT: 16.3 % (ref 11.5–14.5)
GLOBULIN SER CALC-MCNC: 3 G/DL (ref 2–4)
GLUCOSE SERPL-MCNC: 108 MG/DL (ref 65–100)
HCT VFR BLD AUTO: 25.1 % (ref 35–47)
HGB BLD-MCNC: 8.6 G/DL (ref 11.5–16)
IMM GRANULOCYTES # BLD AUTO: 0 K/UL
IMM GRANULOCYTES NFR BLD AUTO: 0 %
LYMPHOCYTES # BLD: 0.5 K/UL (ref 0.8–3.5)
LYMPHOCYTES NFR BLD: 19 % (ref 12–49)
MCH RBC QN AUTO: 32.5 PG (ref 26–34)
MCHC RBC AUTO-ENTMCNC: 34.3 G/DL (ref 30–36.5)
MCV RBC AUTO: 94.7 FL (ref 80–99)
METAMYELOCYTES NFR BLD MANUAL: 1 %
MONOCYTES # BLD: 0.6 K/UL (ref 0–1)
MONOCYTES NFR BLD: 21 % (ref 5–13)
MYELOCYTES NFR BLD MANUAL: 3 %
NEUTS SEG # BLD: 1.5 K/UL (ref 1.8–8)
NEUTS SEG NFR BLD: 53 % (ref 32–75)
NRBC # BLD: 0 K/UL (ref 0–0.01)
NRBC BLD-RTO: 0 PER 100 WBC
PLATELET # BLD AUTO: 99 K/UL (ref 150–400)
PMV BLD AUTO: 10.9 FL (ref 8.9–12.9)
POTASSIUM SERPL-SCNC: 3.5 MMOL/L (ref 3.5–5.1)
PROT SERPL-MCNC: 4.9 G/DL (ref 6.4–8.2)
RBC # BLD AUTO: 2.65 M/UL (ref 3.8–5.2)
RBC MORPH BLD: ABNORMAL
SODIUM SERPL-SCNC: 137 MMOL/L (ref 136–145)
WBC # BLD AUTO: 2.8 K/UL (ref 3.6–11)

## 2023-08-01 PROCEDURE — 6370000000 HC RX 637 (ALT 250 FOR IP): Performed by: INTERNAL MEDICINE

## 2023-08-01 PROCEDURE — 85025 COMPLETE CBC W/AUTO DIFF WBC: CPT

## 2023-08-01 PROCEDURE — 1100000000 HC RM PRIVATE

## 2023-08-01 PROCEDURE — 36415 COLL VENOUS BLD VENIPUNCTURE: CPT

## 2023-08-01 PROCEDURE — 2580000003 HC RX 258: Performed by: INTERNAL MEDICINE

## 2023-08-01 PROCEDURE — 80053 COMPREHEN METABOLIC PANEL: CPT

## 2023-08-01 PROCEDURE — 6370000000 HC RX 637 (ALT 250 FOR IP): Performed by: NURSE PRACTITIONER

## 2023-08-01 PROCEDURE — 2580000003 HC RX 258: Performed by: NURSE PRACTITIONER

## 2023-08-01 RX ORDER — DICYCLOMINE HCL 20 MG
20 TABLET ORAL
Status: DISCONTINUED | OUTPATIENT
Start: 2023-08-01 | End: 2023-08-03 | Stop reason: HOSPADM

## 2023-08-01 RX ORDER — MORPHINE SULFATE 4 MG/ML
2 INJECTION INTRAVENOUS EVERY 4 HOURS PRN
OUTPATIENT
Start: 2023-08-01

## 2023-08-01 RX ORDER — DICYCLOMINE HYDROCHLORIDE 10 MG/1
10 CAPSULE ORAL ONCE
Status: COMPLETED | OUTPATIENT
Start: 2023-08-01 | End: 2023-08-01

## 2023-08-01 RX ADMIN — MEGESTROL ACETATE 400 MG: 40 SUSPENSION ORAL at 09:34

## 2023-08-01 RX ADMIN — DICYCLOMINE HYDROCHLORIDE 20 MG: 20 TABLET ORAL at 20:15

## 2023-08-01 RX ADMIN — ACETAMINOPHEN 650 MG: 325 TABLET ORAL at 07:29

## 2023-08-01 RX ADMIN — OXYCODONE HYDROCHLORIDE 5 MG: 5 TABLET ORAL at 03:52

## 2023-08-01 RX ADMIN — DEXTROSE AND SODIUM CHLORIDE: 5; 450 INJECTION, SOLUTION INTRAVENOUS at 00:09

## 2023-08-01 RX ADMIN — ACETAMINOPHEN 650 MG: 325 TABLET ORAL at 15:34

## 2023-08-01 RX ADMIN — SODIUM CHLORIDE, PRESERVATIVE FREE 10 ML: 5 INJECTION INTRAVENOUS at 20:18

## 2023-08-01 RX ADMIN — DEXTROSE AND SODIUM CHLORIDE: 5; 450 INJECTION, SOLUTION INTRAVENOUS at 09:40

## 2023-08-01 RX ADMIN — DICYCLOMINE HYDROCHLORIDE 10 MG: 10 CAPSULE ORAL at 17:38

## 2023-08-01 RX ADMIN — SODIUM CHLORIDE, PRESERVATIVE FREE 10 ML: 5 INJECTION INTRAVENOUS at 09:35

## 2023-08-01 RX ADMIN — ACETAMINOPHEN 650 MG: 325 TABLET ORAL at 00:09

## 2023-08-01 RX ADMIN — OXYCODONE HYDROCHLORIDE 5 MG: 5 TABLET ORAL at 10:48

## 2023-08-01 ASSESSMENT — PAIN SCALES - GENERAL
PAINLEVEL_OUTOF10: 8
PAINLEVEL_OUTOF10: 3
PAINLEVEL_OUTOF10: 2
PAINLEVEL_OUTOF10: 8
PAINLEVEL_OUTOF10: 6
PAINLEVEL_OUTOF10: 5
PAINLEVEL_OUTOF10: 3
PAINLEVEL_OUTOF10: 10

## 2023-08-01 ASSESSMENT — PAIN DESCRIPTION - ORIENTATION
ORIENTATION: LEFT

## 2023-08-01 ASSESSMENT — PAIN DESCRIPTION - LOCATION
LOCATION: BACK
LOCATION: ABDOMEN
LOCATION: BACK
LOCATION: ABDOMEN
LOCATION: BACK
LOCATION: ABDOMEN
LOCATION: ABDOMEN
LOCATION: BACK

## 2023-08-01 ASSESSMENT — PAIN DESCRIPTION - DESCRIPTORS
DESCRIPTORS: ACHING

## 2023-08-01 ASSESSMENT — ENCOUNTER SYMPTOMS: SHORTNESS OF BREATH: 0

## 2023-08-01 ASSESSMENT — PAIN SCALES - WONG BAKER: WONGBAKER_NUMERICALRESPONSE: 0

## 2023-08-01 NOTE — CARE COORDINATION
CM notified that patient is now looking for SNF placement. CM met with patient to discuss, patient gave choice for [facility] and [facility], referrals sent via carport, patient will not require insurance auth prior to d/c.    CM continues to follow and monitor for needs.

## 2023-08-01 NOTE — NURSE NAVIGATOR
[address]        Oncology Navigator Encounter        Encounter Type:  [x] Inpatient:  [x] Initial encounter   []Follow up encounter        []Outpatient:  []Navigator follow-up  []Patient initiated  []Other:        Narrative:    Accompanied NP Ketan Vallejo to speak with patient regarding discharge and goals of care. Patient denied hospice at this time. Discussed need for supportive care after discharge due to patient's increasing weakness and advised that unless condition improves, her chemotherapy would most likely be placed on hold. Patient verbalized understanding. Patient stated that she is not able to go home as she realizes she needs 24 hour care at this point. Patient asked to go to a facility and stated she wants to go to [facility] or [facility]. Patient's caregiver was also at bedside and confirmed that this would be best option. Spoke with CM to advise of patient's choice. Will continue to follow.          Rhea KATEN RN  Oncology Nurse Navigator/Lung Screening Coordinator  [address]  [phone]   [email or web address]  *Also available on [employer]*

## 2023-08-01 NOTE — FLOWSHEET NOTE
Nutrition Assessment     Type and Reason for Visit: Reassess (interim)    Nutrition Recommendations/Plan:   Continue current diet  Ensure enlive 3x/day  Consider restarting PERT as medically appropriate  Monitor and record PO intakes, supplement acceptance, and Bms in I/Os     Malnutrition Assessment:  Malnutrition Status: Severe malnutrition    Nutrition Assessment:  (P) Pt admitted for FTT and poor po, +pancytopenia and COURTNEY, CT abd finding ascites but no SBO. Pt w/active pancreatic CA on chemo. UTO nutr/wt hx, per SLP note pt with poor appetite, malaise, and metallic taste d/t chemo. No intakes in EMR at this time. RDs consulted for poor po. Pt initially on M/M5 w/0% B eaten this AM per RN, SLP advanced to regular/thins this PM. Will add ONS to better meet needs in setting of hypermetabolic disease. (8/1) Min med updates. Appetite/intakes unimproved, <25%. Per NP note, \"NGT/PEG tube not an option at this late stage of illness\". Plan to continue ONS, current diet. Labs: Na 137, K 3.5, BUN 20, Creat 1.26, Gluc 108. Meds: megestrol, D5%/0.45% NaCl    Estimated Daily Nutrient Needs:  Energy (kcal):  1624-1856kcal/d (35-40kcal/kg, wt gain, pancreatic CA) Weight Used for Energy Requirements: Current     Protein (g):  56-70g/d (1.2-1.5g/kg, pancreatic CA) Weight Used for Protein Requirements: Current        Fluid (ml/day):  1624-1856mL Method Used for Fluid Requirements: 1 ml/kcal    Nutrition Related Findings:   (P) NFPE deferred, pt appears cachectic. No n/v, d/c, SLP following. 1+ RUE, 2+ LUE, b/l LE. BM 7/30. Wound Type: (P) None    Current Nutrition Therapies:    ADULT DIET;  Regular  ADULT ORAL NUTRITION SUPPLEMENT; Breakfast, Lunch, Dinner; Standard High Calorie/High Protein Oral Supplement    Anthropometric Measures:  Height: (P) 157.5 cm (5' 2.01\")  Current Body Wt: (P) 102 lb 4.7 oz (46.4 kg)   BMI: (P) 18.7    Nutrition Diagnosis:   Severe malnutrition, In context of chronic illness related to catabolic illness

## 2023-08-02 PROCEDURE — 2580000003 HC RX 258: Performed by: INTERNAL MEDICINE

## 2023-08-02 PROCEDURE — 1100000000 HC RM PRIVATE

## 2023-08-02 PROCEDURE — P9047 ALBUMIN (HUMAN), 25%, 50ML: HCPCS | Performed by: NURSE PRACTITIONER

## 2023-08-02 PROCEDURE — 6370000000 HC RX 637 (ALT 250 FOR IP): Performed by: NURSE PRACTITIONER

## 2023-08-02 PROCEDURE — 6360000002 HC RX W HCPCS: Performed by: NURSE PRACTITIONER

## 2023-08-02 PROCEDURE — 6370000000 HC RX 637 (ALT 250 FOR IP): Performed by: INTERNAL MEDICINE

## 2023-08-02 RX ORDER — MEGESTROL ACETATE 40 MG/ML
400 SUSPENSION ORAL DAILY
Qty: 240 ML | Refills: 3 | Status: SHIPPED | OUTPATIENT
Start: 2023-08-03

## 2023-08-02 RX ORDER — ALBUMIN (HUMAN) 12.5 G/50ML
12.5 SOLUTION INTRAVENOUS EVERY 6 HOURS
Status: DISCONTINUED | OUTPATIENT
Start: 2023-08-02 | End: 2023-08-03 | Stop reason: HOSPADM

## 2023-08-02 RX ORDER — OXYCODONE HYDROCHLORIDE 5 MG/1
5 TABLET ORAL 3 TIMES DAILY PRN
Qty: 9 TABLET | Refills: 0 | Status: SHIPPED | OUTPATIENT
Start: 2023-08-02 | End: 2023-08-05

## 2023-08-02 RX ORDER — HEPARIN 100 UNIT/ML
100 SYRINGE INTRAVENOUS PRN
Status: DISCONTINUED | OUTPATIENT
Start: 2023-08-02 | End: 2023-08-03 | Stop reason: HOSPADM

## 2023-08-02 RX ORDER — DICYCLOMINE HCL 20 MG
20 TABLET ORAL
Qty: 120 TABLET | Refills: 3 | Status: SHIPPED | OUTPATIENT
Start: 2023-08-02

## 2023-08-02 RX ADMIN — DICYCLOMINE HYDROCHLORIDE 20 MG: 20 TABLET ORAL at 22:39

## 2023-08-02 RX ADMIN — ALBUMIN (HUMAN) 12.5 G: 0.25 INJECTION, SOLUTION INTRAVENOUS at 12:30

## 2023-08-02 RX ADMIN — MEGESTROL ACETATE 400 MG: 40 SUSPENSION ORAL at 08:47

## 2023-08-02 RX ADMIN — DICYCLOMINE HYDROCHLORIDE 20 MG: 20 TABLET ORAL at 17:53

## 2023-08-02 RX ADMIN — OXYCODONE HYDROCHLORIDE 5 MG: 5 TABLET ORAL at 22:39

## 2023-08-02 RX ADMIN — DICYCLOMINE HYDROCHLORIDE 20 MG: 20 TABLET ORAL at 11:44

## 2023-08-02 RX ADMIN — DICYCLOMINE HYDROCHLORIDE 20 MG: 20 TABLET ORAL at 07:42

## 2023-08-02 RX ADMIN — ALBUMIN (HUMAN) 12.5 G: 0.25 INJECTION, SOLUTION INTRAVENOUS at 21:59

## 2023-08-02 RX ADMIN — ALBUMIN (HUMAN) 12.5 G: 0.25 INJECTION, SOLUTION INTRAVENOUS at 17:53

## 2023-08-02 RX ADMIN — OXYCODONE HYDROCHLORIDE 5 MG: 5 TABLET ORAL at 02:53

## 2023-08-02 RX ADMIN — SODIUM CHLORIDE, PRESERVATIVE FREE 10 ML: 5 INJECTION INTRAVENOUS at 08:51

## 2023-08-02 RX ADMIN — SODIUM CHLORIDE, PRESERVATIVE FREE 10 ML: 5 INJECTION INTRAVENOUS at 22:40

## 2023-08-02 ASSESSMENT — PAIN SCALES - GENERAL
PAINLEVEL_OUTOF10: 10
PAINLEVEL_OUTOF10: 0
PAINLEVEL_OUTOF10: 6
PAINLEVEL_OUTOF10: 2

## 2023-08-02 ASSESSMENT — PAIN DESCRIPTION - LOCATION: LOCATION: BACK

## 2023-08-02 ASSESSMENT — PAIN DESCRIPTION - DESCRIPTORS: DESCRIPTORS: ACHING

## 2023-08-02 NOTE — CARE COORDINATION
DAGMAR spoke with daniela at [facility], she stated she did not know if she would have a bed available for patient tomorrow however would look into it and let CM know. [facility] has accepted and will have bed available. CM met with patient and son to discuss DC, they still prefer [facility] but understand they may not have a bed available and may need to d/c to [facility]. DAGMAR continues to follow and monitor for needs.

## 2023-08-02 NOTE — DISCHARGE SUMMARY
Hospitalist Discharge Summary     Patient ID:    Carrie Antony  [id number]  87 y.o.  1955    Admit date: 7/27/2023    Discharge date : 8/2/2023      Final Diagnoses:   Principal Problem:    Failure to thrive in adult  Active Problems:    Rheumatism    Joint pain    HTN (hypertension)    Pancreatic mass  Resolved Problems:    * No resolved hospital problems. *      Reason for Hospitalization/Hospital Course:     Chief complaint on admission: poor oral intake and generalized weakness     Carrie Antony is a 80 y.o. female with PMH of SMA thrombosis,  pancreatic cancer, hypertension presented to the ED with chief complaint of poor oral intake and generalized weakness. Family noticed that she has been unable to tolerate PO intake in the past week. Reports abdominal discomfort with PO intake and nausea, however has no vomiting. Also has diarrhea. Otherwise no fever or chills. Patient is poor historian due to extreme fatigue, however reports decreased appetite. Of note, she has pancreatic cancer in which she is receiving chemotherapy, last session on previous Monday. Dr. Leeanne Jean is her oncologist     In the ED, noted hypotensive. Lab significant for pancytopenia and COURTNEY. Chest X-ray no acute finding. . recent CT abdomen (7/7) showed showed increased ascites but no bowel obstruction mentioned. Case discussed with ED physician and agree that patient require inpatient admission/ observation for further management. 7/28: Speech evaluated the patient this morning, recommending regular thin liquids. Will obtain dietary consult. PT was able to work with her this morning. Oncology consult pending. 7/29: Patient was seen by oncology and dietary. Will start oral supplementation. Added D5 1/2 NS at 100 cc/hr. Continue to encourage PO intake. Refused SCDs  yesterday.      7/30: Patient with a significant decline in her overall

## 2023-08-03 ENCOUNTER — CARE COORDINATION (OUTPATIENT)
Dept: OTHER | Facility: CLINIC | Age: 68
End: 2023-08-03

## 2023-08-03 VITALS
HEIGHT: 62 IN | RESPIRATION RATE: 18 BRPM | SYSTOLIC BLOOD PRESSURE: 96 MMHG | HEART RATE: 71 BPM | OXYGEN SATURATION: 100 % | DIASTOLIC BLOOD PRESSURE: 75 MMHG | BODY MASS INDEX: 18.82 KG/M2 | WEIGHT: 102.29 LBS | TEMPERATURE: 97.5 F

## 2023-08-03 PROCEDURE — 6370000000 HC RX 637 (ALT 250 FOR IP): Performed by: NURSE PRACTITIONER

## 2023-08-03 PROCEDURE — 2580000003 HC RX 258: Performed by: INTERNAL MEDICINE

## 2023-08-03 PROCEDURE — 97530 THERAPEUTIC ACTIVITIES: CPT

## 2023-08-03 PROCEDURE — 6360000002 HC RX W HCPCS: Performed by: NURSE PRACTITIONER

## 2023-08-03 PROCEDURE — P9047 ALBUMIN (HUMAN), 25%, 50ML: HCPCS | Performed by: NURSE PRACTITIONER

## 2023-08-03 PROCEDURE — 6370000000 HC RX 637 (ALT 250 FOR IP): Performed by: INTERNAL MEDICINE

## 2023-08-03 RX ORDER — HEPARIN 100 UNIT/ML
3 SYRINGE INTRAVENOUS PRN
Status: DISCONTINUED | OUTPATIENT
Start: 2023-08-03 | End: 2023-08-03

## 2023-08-03 RX ORDER — HEPARIN SODIUM,PORCINE/PF 10 UNIT/ML
3 SYRINGE (ML) INTRAVENOUS PRN
Status: DISCONTINUED | OUTPATIENT
Start: 2023-08-03 | End: 2023-08-03 | Stop reason: CLARIF

## 2023-08-03 RX ADMIN — OXYCODONE HYDROCHLORIDE 5 MG: 5 TABLET ORAL at 03:26

## 2023-08-03 RX ADMIN — ALBUMIN (HUMAN) 12.5 G: 0.25 INJECTION, SOLUTION INTRAVENOUS at 03:05

## 2023-08-03 RX ADMIN — ACETAMINOPHEN 650 MG: 325 TABLET ORAL at 00:50

## 2023-08-03 RX ADMIN — ALBUMIN (HUMAN) 12.5 G: 0.25 INJECTION, SOLUTION INTRAVENOUS at 10:09

## 2023-08-03 RX ADMIN — DICYCLOMINE HYDROCHLORIDE 20 MG: 20 TABLET ORAL at 11:08

## 2023-08-03 RX ADMIN — MEGESTROL ACETATE 400 MG: 40 SUSPENSION ORAL at 10:09

## 2023-08-03 RX ADMIN — DICYCLOMINE HYDROCHLORIDE 20 MG: 20 TABLET ORAL at 06:15

## 2023-08-03 RX ADMIN — OXYCODONE HYDROCHLORIDE 5 MG: 5 TABLET ORAL at 13:36

## 2023-08-03 RX ADMIN — HEPARIN 100 UNITS: 100 SYRINGE at 17:16

## 2023-08-03 RX ADMIN — SODIUM CHLORIDE, PRESERVATIVE FREE 10 ML: 5 INJECTION INTRAVENOUS at 10:09

## 2023-08-03 ASSESSMENT — PAIN SCALES - GENERAL
PAINLEVEL_OUTOF10: 10
PAINLEVEL_OUTOF10: 8

## 2023-08-03 ASSESSMENT — PAIN DESCRIPTION - LOCATION
LOCATION: BACK
LOCATION: BACK

## 2023-08-03 NOTE — DISCHARGE SUMMARY
Hospitalist Discharge Summary     Patient ID:    Janak Long  [id number]  26 y.o.  1955    Admit date: 7/27/2023    Discharge date : 8/3/2023      Final Diagnoses:   Principal Problem:    Failure to thrive in adult  Active Problems:    Rheumatism    Joint pain    HTN (hypertension)    Pancreatic mass  Resolved Problems:    * No resolved hospital problems. *      Reason for Hospitalization/Hospital Course:     Chief complaint on admission: poor oral intake and generalized weakness     Janak Long is a 80 y.o. female with PMH of SMA thrombosis,  pancreatic cancer, hypertension presented to the ED with chief complaint of poor oral intake and generalized weakness. Family noticed that she has been unable to tolerate PO intake in the past week. Reports abdominal discomfort with PO intake and nausea, however has no vomiting. Also has diarrhea. Otherwise no fever or chills. Patient is poor historian due to extreme fatigue, however reports decreased appetite. Of note, she has pancreatic cancer in which she is receiving chemotherapy, last session on previous Monday. Dr. Ronnie Trejo is her oncologist     In the ED, noted hypotensive. Lab significant for pancytopenia and COURTNEY. Chest X-ray no acute finding. . recent CT abdomen (7/7) showed showed increased ascites but no bowel obstruction mentioned. Case discussed with ED physician and agree that patient require inpatient admission/ observation for further management. 7/28: Speech evaluated the patient this morning, recommending regular thin liquids. Will obtain dietary consult. PT was able to work with her this morning. Oncology consult pending. 7/29: Patient was seen by oncology and dietary. Will start oral supplementation. Added D5 1/2 NS at 100 cc/hr. Continue to encourage PO intake. Refused SCDs  yesterday.      7/30: Patient with a significant decline in her overall

## 2023-08-03 NOTE — CARE COORDINATION
ACM contacted the patient and caregiver (son) to follow up on progress, discuss new issues or concerns, and reinforce/provide patient education. Summary Note: Patient states she remains hospitalized, continues to feel weak. Gave me permission to speak with her son. He provided medicare number, which will be passed on to ANGELA Jeffers (Medicare # [id number]). She is supposed to discharge to [facility] for inpatient rehabilitation. Plan:  Plan for follow-up call in 1-2 days based on severity of symptoms and risk factors. Plan for next call:  chart review, discharge? Patient  verbalized understanding and is agreeable to follow up call.

## 2023-08-03 NOTE — CARE COORDINATION
Patient will discharge to [facility] today. Nurse to call report to [phone]. Patient will require stretcher transport. Primary nurse is aware. Transition of Care Plan:    RUR: 19%  Prior Level of Functioning: Home selfcare  Disposition: SSNF  If SNF or IPR: Date FOC offered: SNF  Date FOC received: 8/3/2023  Accepting facility: [facility]  Date authorization started with reference number:Not required  Date authorization received and expires: Follow up appointments: set up by nursing as needed  DME needed: None  Transportation at discharge: Medical  IM/IMM Medicare/ letter given: Yes  Is patient a  and connected with VA? No If yes, was [employer] transfer form completed and VA notified? Caregiver Contact: [phone]  Discharge Caregiver contacted prior to discharge? LM to return call  Care Conference needed?  No  Barriers to discharge: None

## 2023-08-07 ENCOUNTER — CARE COORDINATION (OUTPATIENT)
Dept: OTHER | Facility: CLINIC | Age: 68
End: 2023-08-07

## 2023-08-07 NOTE — CARE COORDINATION
Ambulatory Care Coordination Note    ACM attempted to reach patient for care management follow up call regarding check in, patient transitioned to SNF on 8/3/24. HIPAA compliant message left requesting a return phone call at patient convenience. Plan for follow-up call in 5-7 days    No future appointments.

## 2023-08-08 ENCOUNTER — CARE COORDINATION (OUTPATIENT)
Dept: OTHER | Facility: CLINIC | Age: 68
End: 2023-08-08

## 2023-08-08 NOTE — CARE COORDINATION
MSW Documentation    MSW contacted patient son, Jessica Yin for follow up. Patient identifiers confirmed, zip code and . Patient referred by Nurse Alba Calhoun. Care Coordination Episodes    Type: Amb Care Management  Episode: Rising Risk  Noted: 2023     Purpose of TC  Provide information for applying for Medicare Part B    TC Details  Patient son stated that he was aware of patient not having Medicare Part B. He was interested in assisting patient with obtaining the coverage. Plan of action  MSW [facility] agreed to send patient son the link to apply for Medicare Part B online. Mr. Josue Dumnot was in agreement. MSW provided contact information for future needs. Plan for follow-up call in 10-14 days      Goals         Conditions and Symptoms       I will schedule office visits, as directed by my provider. I will keep my appointment or reschedule if I have to cancel. I will notify my provider of any barriers to my plan of care. I will notify my provider of any symptoms that indicate a worsening of my condition. Patient has adult son to support her.     Barriers: none  Plan for overcoming my barriers: N/A  Confidence: 10/10  Anticipated Goal Completion Date: 23          Patient will acquire Medicare Part B and D (pt-stated)       Patient will acquire Medicare Part B and Part D    Barriers: lack of support  Plan for overcoming my barriers: Patient needs support from family/friends  Confidence: 7/10  Anticipated Goal Completion Date: 10/01/23

## 2023-08-09 ENCOUNTER — CARE COORDINATION (OUTPATIENT)
Dept: OTHER | Facility: CLINIC | Age: 68
End: 2023-08-09

## 2023-08-09 NOTE — CARE COORDINATION
MSW Documentation    MSW [facility] received TC patient son. Patient identifiers confirmed, zip code and . Patient referred by Nurse Viv Santos. Care Coordination Episodes    Type: Amb Care Management  Episode: Rising Risk  Noted: 2023     Purpose of TC  Complete online application for Medicare Part B.    TC Details  MSW ACM completed online application for Medicare Part Tobias Michele stated that patient is paying for a \"sitter\"  out of pocket. Based on patient income, it appears that patient could qualify for Medicaid long term care. Mr. Woodrow Weaver is interested in pursuing this option. [facility] information about [facility] benefits and information that would be needed to complete. Mr. Woodrow Weaver will contact [facility] will when he is ready to proceed. Plan of action  Assist family with completing application for Medicaid long term care. MSW provided contact information for future needs.  Plan for follow-up call in 7-10 days      Goals Addressed                      This Visit's Progress      Patient will apply for Medicaid long term care (pt-stated)         Patient will apply for Medicaid long term care    Barriers: overwhelmed by complexity of regimen  Plan for overcoming my barriers: Patient need assistance with completing task  Confidence: 8/10  Anticipated Goal Completion Date: 23

## 2023-08-14 ENCOUNTER — CARE COORDINATION (OUTPATIENT)
Dept: OTHER | Facility: CLINIC | Age: 68
End: 2023-08-14

## 2023-08-14 NOTE — CARE COORDINATION
Ambulatory Care Coordination Note    ACM attempted to reach patient for care management follow up call regarding check in, SNF? Leighann Méndez HIPAA compliant message left requesting a return phone call at patient convenience. Plan for follow-up call in 5-7 days    No future appointments.

## 2023-08-21 ENCOUNTER — CARE COORDINATION (OUTPATIENT)
Dept: OTHER | Facility: CLINIC | Age: 68
End: 2023-08-21